# Patient Record
Sex: MALE | Race: WHITE | NOT HISPANIC OR LATINO | URBAN - METROPOLITAN AREA
[De-identification: names, ages, dates, MRNs, and addresses within clinical notes are randomized per-mention and may not be internally consistent; named-entity substitution may affect disease eponyms.]

---

## 2017-06-12 PROBLEM — Z00.00 ENCOUNTER FOR PREVENTIVE HEALTH EXAMINATION: Status: ACTIVE | Noted: 2017-06-12

## 2017-06-13 ENCOUNTER — OUTPATIENT (OUTPATIENT)
Dept: OUTPATIENT SERVICES | Facility: HOSPITAL | Age: 72
LOS: 1 days | End: 2017-06-13
Payer: MEDICARE

## 2017-06-13 ENCOUNTER — APPOINTMENT (OUTPATIENT)
Dept: MRI IMAGING | Facility: CLINIC | Age: 72
End: 2017-06-13

## 2017-06-13 DIAGNOSIS — Z00.8 ENCOUNTER FOR OTHER GENERAL EXAMINATION: ICD-10-CM

## 2017-06-13 PROCEDURE — 73721 MRI JNT OF LWR EXTRE W/O DYE: CPT | Mod: 26,RT

## 2017-06-13 PROCEDURE — 73718 MRI LOWER EXTREMITY W/O DYE: CPT

## 2017-06-13 PROCEDURE — 73718 MRI LOWER EXTREMITY W/O DYE: CPT | Mod: 26,RT

## 2017-06-13 PROCEDURE — 73721 MRI JNT OF LWR EXTRE W/O DYE: CPT

## 2017-06-21 ENCOUNTER — FORM ENCOUNTER (OUTPATIENT)
Age: 72
End: 2017-06-21

## 2017-06-22 ENCOUNTER — APPOINTMENT (OUTPATIENT)
Dept: ULTRASOUND IMAGING | Facility: CLINIC | Age: 72
End: 2017-06-22

## 2017-06-22 ENCOUNTER — OUTPATIENT (OUTPATIENT)
Dept: OUTPATIENT SERVICES | Facility: HOSPITAL | Age: 72
LOS: 1 days | End: 2017-06-22
Payer: MEDICARE

## 2017-06-22 ENCOUNTER — APPOINTMENT (OUTPATIENT)
Dept: ORTHOPEDIC SURGERY | Facility: CLINIC | Age: 72
End: 2017-06-22

## 2017-06-22 VITALS
WEIGHT: 152 LBS | HEART RATE: 62 BPM | DIASTOLIC BLOOD PRESSURE: 73 MMHG | HEIGHT: 71 IN | BODY MASS INDEX: 21.28 KG/M2 | SYSTOLIC BLOOD PRESSURE: 111 MMHG

## 2017-06-22 DIAGNOSIS — Z00.8 ENCOUNTER FOR OTHER GENERAL EXAMINATION: ICD-10-CM

## 2017-06-22 DIAGNOSIS — Z56.0 UNEMPLOYMENT, UNSPECIFIED: ICD-10-CM

## 2017-06-22 DIAGNOSIS — E78.00 PURE HYPERCHOLESTEROLEMIA, UNSPECIFIED: ICD-10-CM

## 2017-06-22 DIAGNOSIS — Z78.9 OTHER SPECIFIED HEALTH STATUS: ICD-10-CM

## 2017-06-22 DIAGNOSIS — M79.671 PAIN IN RIGHT FOOT: ICD-10-CM

## 2017-06-22 PROCEDURE — 93970 EXTREMITY STUDY: CPT

## 2017-06-22 SDOH — ECONOMIC STABILITY - INCOME SECURITY: UNEMPLOYMENT, UNSPECIFIED: Z56.0

## 2017-06-23 PROBLEM — Z78.9 EXERCISES OCCASIONALLY: Status: ACTIVE | Noted: 2017-06-22

## 2017-06-23 PROBLEM — Z56.0 UNEMPLOYED: Status: ACTIVE | Noted: 2017-06-22

## 2017-06-23 PROBLEM — Z78.9 DOES NOT USE ILLICIT DRUGS: Status: ACTIVE | Noted: 2017-06-22

## 2017-06-23 PROBLEM — E78.00 HIGH CHOLESTEROL: Status: RESOLVED | Noted: 2017-06-22 | Resolved: 2017-06-23

## 2017-06-23 PROBLEM — Z78.9 CONSUMES ALCOHOL OCCASIONALLY: Status: ACTIVE | Noted: 2017-06-22

## 2017-06-23 RX ORDER — RIVAROXABAN 20 MG/1
20 TABLET, FILM COATED ORAL
Refills: 0 | Status: ACTIVE | COMMUNITY

## 2017-06-23 RX ORDER — HYDROCHLOROTHIAZIDE 12.5 MG/1
12.5 CAPSULE ORAL
Refills: 0 | Status: ACTIVE | COMMUNITY

## 2017-06-23 RX ORDER — ATORVASTATIN CALCIUM 20 MG/1
20 TABLET, FILM COATED ORAL
Refills: 0 | Status: ACTIVE | COMMUNITY

## 2017-06-23 RX ORDER — ATENOLOL 25 MG/1
25 TABLET ORAL
Refills: 0 | Status: ACTIVE | COMMUNITY

## 2017-06-23 RX ORDER — RAMIPRIL 5 MG/1
CAPSULE ORAL
Refills: 0 | Status: ACTIVE | COMMUNITY

## 2021-03-02 ENCOUNTER — APPOINTMENT (OUTPATIENT)
Dept: SURGERY | Facility: CLINIC | Age: 76
End: 2021-03-02

## 2021-04-05 ENCOUNTER — OUTPATIENT (OUTPATIENT)
Dept: OUTPATIENT SERVICES | Facility: HOSPITAL | Age: 76
LOS: 1 days | End: 2021-04-05

## 2021-04-05 DIAGNOSIS — K40.91 UNILATERAL INGUINAL HERNIA, WITHOUT OBSTRUCTION OR GANGRENE, RECURRENT: ICD-10-CM

## 2021-04-05 DIAGNOSIS — Z98.890 OTHER SPECIFIED POSTPROCEDURAL STATES: Chronic | ICD-10-CM

## 2021-04-05 DIAGNOSIS — Z90.89 ACQUIRED ABSENCE OF OTHER ORGANS: Chronic | ICD-10-CM

## 2021-04-05 DIAGNOSIS — Z01.818 ENCOUNTER FOR OTHER PREPROCEDURAL EXAMINATION: ICD-10-CM

## 2021-04-05 NOTE — CHART NOTE - NSCHARTNOTEFT_GEN_A_CORE
Plan  1. Stop all NSAIDS, herbal supplements and vitamins for 7 days.  2. NPO as per ASU instructions  3. Take the following medications ( Ramipril, Atenolol ) with small sips of water on the morning of your procedure/surgery.  4. Use EZ sponges as directed  5. Use mupirocin as directed  6. Labs, EKG, on chart from PMD. COVID test on 04/09/2021, pt instructed.   7. PMD/cardiologist visit for optimization prior to surgery as per surgeon.  8. Advised to quarantine prior to surgery

## 2021-04-06 DIAGNOSIS — K40.91 UNILATERAL INGUINAL HERNIA, WITHOUT OBSTRUCTION OR GANGRENE, RECURRENT: ICD-10-CM

## 2021-04-06 DIAGNOSIS — Z01.818 ENCOUNTER FOR OTHER PREPROCEDURAL EXAMINATION: ICD-10-CM

## 2021-04-08 DIAGNOSIS — Z01.818 ENCOUNTER FOR OTHER PREPROCEDURAL EXAMINATION: ICD-10-CM

## 2021-04-09 ENCOUNTER — APPOINTMENT (OUTPATIENT)
Dept: DISASTER EMERGENCY | Facility: CLINIC | Age: 76
End: 2021-04-09

## 2021-04-10 LAB — SARS-COV-2 N GENE NPH QL NAA+PROBE: NOT DETECTED

## 2021-04-12 ENCOUNTER — OUTPATIENT (OUTPATIENT)
Dept: INPATIENT UNIT | Facility: HOSPITAL | Age: 76
LOS: 1 days | Discharge: HOME | End: 2021-04-12
Payer: MEDICARE

## 2021-04-12 ENCOUNTER — RESULT REVIEW (OUTPATIENT)
Age: 76
End: 2021-04-12

## 2021-04-12 VITALS
OXYGEN SATURATION: 97 % | HEART RATE: 56 BPM | SYSTOLIC BLOOD PRESSURE: 119 MMHG | TEMPERATURE: 97 F | RESPIRATION RATE: 16 BRPM | DIASTOLIC BLOOD PRESSURE: 82 MMHG

## 2021-04-12 VITALS
SYSTOLIC BLOOD PRESSURE: 127 MMHG | HEART RATE: 55 BPM | RESPIRATION RATE: 16 BRPM | HEIGHT: 70.5 IN | OXYGEN SATURATION: 98 % | WEIGHT: 158.95 LBS | DIASTOLIC BLOOD PRESSURE: 84 MMHG | TEMPERATURE: 98 F

## 2021-04-12 DIAGNOSIS — E78.5 HYPERLIPIDEMIA, UNSPECIFIED: ICD-10-CM

## 2021-04-12 DIAGNOSIS — Z79.899 OTHER LONG TERM (CURRENT) DRUG THERAPY: ICD-10-CM

## 2021-04-12 DIAGNOSIS — I25.10 ATHEROSCLEROTIC HEART DISEASE OF NATIVE CORONARY ARTERY WITHOUT ANGINA PECTORIS: ICD-10-CM

## 2021-04-12 DIAGNOSIS — I44.0 ATRIOVENTRICULAR BLOCK, FIRST DEGREE: ICD-10-CM

## 2021-04-12 DIAGNOSIS — N40.0 BENIGN PROSTATIC HYPERPLASIA WITHOUT LOWER URINARY TRACT SYMPTOMS: ICD-10-CM

## 2021-04-12 DIAGNOSIS — Z90.89 ACQUIRED ABSENCE OF OTHER ORGANS: Chronic | ICD-10-CM

## 2021-04-12 DIAGNOSIS — Z98.890 OTHER SPECIFIED POSTPROCEDURAL STATES: Chronic | ICD-10-CM

## 2021-04-12 DIAGNOSIS — K40.91 UNILATERAL INGUINAL HERNIA, WITHOUT OBSTRUCTION OR GANGRENE, RECURRENT: ICD-10-CM

## 2021-04-12 DIAGNOSIS — D17.6 BENIGN LIPOMATOUS NEOPLASM OF SPERMATIC CORD: ICD-10-CM

## 2021-04-12 DIAGNOSIS — I10 ESSENTIAL (PRIMARY) HYPERTENSION: ICD-10-CM

## 2021-04-12 DIAGNOSIS — Z86.718 PERSONAL HISTORY OF OTHER VENOUS THROMBOSIS AND EMBOLISM: ICD-10-CM

## 2021-04-12 DIAGNOSIS — L90.5 SCAR CONDITIONS AND FIBROSIS OF SKIN: ICD-10-CM

## 2021-04-12 DIAGNOSIS — I08.1 RHEUMATIC DISORDERS OF BOTH MITRAL AND TRICUSPID VALVES: ICD-10-CM

## 2021-04-12 PROCEDURE — 88304 TISSUE EXAM BY PATHOLOGIST: CPT

## 2021-04-12 PROCEDURE — C1781: CPT

## 2021-04-12 PROCEDURE — 88304 TISSUE EXAM BY PATHOLOGIST: CPT | Mod: 26

## 2021-04-12 RX ORDER — ACETAMINOPHEN 500 MG
1000 TABLET ORAL ONCE
Refills: 0 | Status: DISCONTINUED | OUTPATIENT
Start: 2021-04-12 | End: 2021-04-12

## 2021-04-12 RX ORDER — ONDANSETRON 8 MG/1
4 TABLET, FILM COATED ORAL ONCE
Refills: 0 | Status: DISCONTINUED | OUTPATIENT
Start: 2021-04-12 | End: 2021-04-12

## 2021-04-12 RX ORDER — ATENOLOL 25 MG/1
1 TABLET ORAL
Qty: 0 | Refills: 0 | DISCHARGE

## 2021-04-12 RX ORDER — ATORVASTATIN CALCIUM 80 MG/1
1 TABLET, FILM COATED ORAL
Qty: 0 | Refills: 0 | DISCHARGE

## 2021-04-12 RX ORDER — HYDROMORPHONE HYDROCHLORIDE 2 MG/ML
0.5 INJECTION INTRAMUSCULAR; INTRAVENOUS; SUBCUTANEOUS
Refills: 0 | Status: DISCONTINUED | OUTPATIENT
Start: 2021-04-12 | End: 2021-04-12

## 2021-04-12 RX ORDER — FENTANYL CITRATE 50 UG/ML
50 INJECTION INTRAVENOUS
Refills: 0 | Status: DISCONTINUED | OUTPATIENT
Start: 2021-04-12 | End: 2021-04-12

## 2021-04-12 RX ORDER — SODIUM CHLORIDE 9 MG/ML
1000 INJECTION, SOLUTION INTRAVENOUS
Refills: 0 | Status: DISCONTINUED | OUTPATIENT
Start: 2021-04-12 | End: 2021-04-12

## 2021-04-12 RX ORDER — RAMIPRIL 5 MG
1 CAPSULE ORAL
Qty: 0 | Refills: 0 | DISCHARGE

## 2021-04-12 RX ORDER — OXYCODONE HYDROCHLORIDE 5 MG/1
5 TABLET ORAL ONCE
Refills: 0 | Status: DISCONTINUED | OUTPATIENT
Start: 2021-04-12 | End: 2021-04-12

## 2021-04-12 NOTE — ASU DISCHARGE PLAN (ADULT/PEDIATRIC) - CARE PROVIDER_API CALL
Vish Grant)  Surgery; Surgical Critical Care  [address] [place] [address]  Phone: [phone]  Fax: [phone]  Follow Up Time:

## 2021-04-12 NOTE — ASU DISCHARGE PLAN (ADULT/PEDIATRIC) - ASU DC SPECIAL INSTRUCTIONSFT
Please follow up in Dr. Grant surgery office in two weeks. Please take dressing off in 2 days following surgery then ok to wash area with warm soapy water. No heavy lifting for 4-6 weeks following surgery, <10lbs. If any acute changes in medical condition report to emergency department.

## 2021-04-12 NOTE — BRIEF OPERATIVE NOTE - OPERATION/FINDINGS
Open inguinal hernia repair with mesh  Extensive scarring noted from prior hernia repair with mesh  Exparel block applied  PACU in stable condition

## 2022-05-03 ENCOUNTER — NON-APPOINTMENT (OUTPATIENT)
Age: 77
End: 2022-05-03

## 2022-07-14 ENCOUNTER — EMERGENCY (EMERGENCY)
Facility: HOSPITAL | Age: 77
LOS: 0 days | Discharge: HOME | End: 2022-07-14
Attending: STUDENT IN AN ORGANIZED HEALTH CARE EDUCATION/TRAINING PROGRAM
Payer: MEDICARE

## 2022-07-14 VITALS — HEIGHT: 70 IN | WEIGHT: 156.09 LBS

## 2022-07-14 VITALS
TEMPERATURE: 98 F | RESPIRATION RATE: 18 BRPM | HEART RATE: 60 BPM | SYSTOLIC BLOOD PRESSURE: 126 MMHG | OXYGEN SATURATION: 99 % | DIASTOLIC BLOOD PRESSURE: 62 MMHG

## 2022-07-14 DIAGNOSIS — I10 ESSENTIAL (PRIMARY) HYPERTENSION: ICD-10-CM
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
## 2022-07-14 DIAGNOSIS — Z23 ENCOUNTER FOR IMMUNIZATION: ICD-10-CM

## 2022-07-14 DIAGNOSIS — Z90.89 ACQUIRED ABSENCE OF OTHER ORGANS: Chronic | ICD-10-CM

## 2022-07-14 DIAGNOSIS — Z98.890 OTHER SPECIFIED POSTPROCEDURAL STATES: Chronic | ICD-10-CM
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
## 2022-07-14 DIAGNOSIS — R51.9 HEADACHE, UNSPECIFIED: ICD-10-CM

## 2022-07-14 DIAGNOSIS — E78.00 PURE HYPERCHOLESTEROLEMIA, UNSPECIFIED: ICD-10-CM

## 2022-07-14 DIAGNOSIS — S01.01XA LACERATION WITHOUT FOREIGN BODY OF SCALP, INITIAL ENCOUNTER: ICD-10-CM

## 2022-07-14 DIAGNOSIS — S09.90XA UNSPECIFIED INJURY OF HEAD, INITIAL ENCOUNTER: ICD-10-CM

## 2022-07-14 DIAGNOSIS — K40.90 UNILATERAL INGUINAL HERNIA, WITHOUT OBSTRUCTION OR GANGRENE, NOT SPECIFIED AS RECURRENT: ICD-10-CM

## 2022-07-14 PROCEDURE — 90715 TDAP VACCINE 7 YRS/> IM: CPT

## 2022-07-14 PROCEDURE — 70450 CT HEAD/BRAIN W/O DYE: CPT | Mod: MA

## 2022-07-14 PROCEDURE — 70450 CT HEAD/BRAIN W/O DYE: CPT | Mod: 26,MA

## 2022-07-14 PROCEDURE — 99284 EMERGENCY DEPT VISIT MOD MDM: CPT | Mod: 25

## 2022-07-14 PROCEDURE — 12002 RPR S/N/AX/GEN/TRNK2.6-7.5CM: CPT

## 2022-07-14 RX ORDER — TETANUS TOXOID, REDUCED DIPHTHERIA TOXOID AND ACELLULAR PERTUSSIS VACCINE, ADSORBED 5; 2.5; 8; 8; 2.5 [IU]/.5ML; [IU]/.5ML; UG/.5ML; UG/.5ML; UG/.5ML
0.5 SUSPENSION INTRAMUSCULAR ONCE
Refills: 0 | Status: COMPLETED | OUTPATIENT
Start: 2022-07-14 | End: 2022-07-14

## 2022-07-14 RX ADMIN — TETANUS TOXOID, REDUCED DIPHTHERIA TOXOID AND ACELLULAR PERTUSSIS VACCINE, ADSORBED 0.5 MILLILITER(S): 5; 2.5; 8; 8; 2.5 SUSPENSION INTRAMUSCULAR at 12:12

## 2022-07-14 NOTE — ED PROVIDER NOTE - NSFOLLOWUPINSTRUCTIONS_ED_ALL_ED_FT
Laceration    WHAT YOU NEED TO KNOW:    A laceration is an injury to the skin and the soft tissue underneath it. Lacerations happen when you are cut or hit by something. They can happen anywhere on the body.     DISCHARGE INSTRUCTIONS:    Return to the emergency department if:     You have heavy bleeding or bleeding that does not stop after 10 minutes of holding firm, direct pressure over the wound.       Your wound opens up.     Contact your healthcare provider if:     You have a fever or chills.       Your laceration is red, warm, or swollen.      You have red streaks on your skin coming from your wound.      You have white or yellow drainage from the wound that smells bad.      You have pain that gets worse, even after treatment.       You have questions or concerns about your condition or care.     Medicines:     Prescription pain medicine may be given. Ask how to take this medicine safely.       Antibiotics help treat or prevent a bacterial infection.       Take your medicine as directed. Contact your healthcare provider if you think your medicine is not helping or if you have side effects. Tell him or her if you are allergic to any medicine. Keep a list of the medicines, vitamins, and herbs you take. Include the amounts, and when and why you take them. Bring the list or the pill bottles to follow-up visits. Carry your medicine list with you in case of an emergency.    Care for your wound as directed:     Do not get your wound wet until your healthcare provider says it is okay. Do not soak your wound in water. Do not go swimming until your healthcare provider says it is okay. Carefully wash the wound with soap and water. Gently pat the area dry or allow it to air dry.       Change your bandages when they get wet, dirty, or after washing. Apply new, clean bandages as directed. Do not apply elastic bandages or tape too tight. Do not put powders or lotions over your incision.       Apply antibiotic ointment as directed. Your healthcare provider may give you antibiotic ointment to put over your wound if you have stitches. If you have strips of tape over your incision, let them dry up and fall off on their own. If they do not fall off within 14 days, gently remove them. If you have glue over your wound, do not remove or pick at it. If your glue comes off, do not replace it with glue that you have at home.       Check your wound every day for signs of infection such as swelling, redness, or pus.     Self-care:     Apply ice on your wound for 15 to 20 minutes every hour or as directed. Use an ice pack, or put crushed ice in a plastic bag. Cover it with a towel. Ice helps prevent tissue damage and decreases swelling and pain.      Use a splint as directed. A splint will decrease movement and stress on your wound. It may help it heal faster. A splint may be used for lacerations over joints or areas of your body that bend. Ask your healthcare provider how to apply and remove a splint.       Decrease scarring of your wound by applying ointments as directed. Do not apply ointments until your healthcare provider says it is okay. You may need to wait until your wound is healed. Ask which ointment to buy and how often to use it. After your wound is healed, use sunscreen over the area when you are out in the sun. You should do this for at least 6 months to 1 year after your injury.     Follow up with your healthcare provider as directed: You may need to follow up in 24 to 48 hours to have your wound checked for infection. You will need to return in 3 to 14 days if you have stitches or staples so they can be removed. Care for your wound as directed to prevent infection and help it heal. Write down your questions so you remember to ask them during your visits.    Staple removal in 10 days

## 2022-07-14 NOTE — ED ADULT TRIAGE NOTE - CHIEF COMPLAINT QUOTE
pt. c/o headache s/p head strike on construction site, approx 6cm horseshoe shaped avulsion on crown of head. Pt. states he was walked and did not ducked down far enough and hit top of head, no fall no blood thinners, no LOC. neuro alert called.

## 2022-07-14 NOTE — ED PROVIDER NOTE - NSICDXPASTSURGICALHX_GEN_ALL_CORE_FT
PAST SURGICAL HISTORY:  H/O left inguinal hernia repair     H/O right inguinal hernia repair     History of tonsillectomy

## 2022-07-14 NOTE — ED PROVIDER NOTE - OBJECTIVE STATEMENT
75 y/o male was working on his house and he didn't realize his positioning causing him to hit his head on wood, endorsing HA, no LOC, no blood thinners no neck pain. 77 y/o male was working on his house with closed head injury; hit on piece of wood; no loc; no anticoagulation; no fever or chills; no chest pain; no sob; no abdominal pain; no weakness; no numbness.

## 2022-07-14 NOTE — ED PROVIDER NOTE - PATIENT PORTAL LINK FT
You can access the FollowMyHealth Patient Portal offered by [facility] by registering at the following website: [email or web address]. By joining [facility]’s FollowMyHealth portal, you will also be able to view your health information using other applications (apps) compatible with our system.

## 2022-07-14 NOTE — ED PROVIDER NOTE - PROGRESS NOTE DETAILS
Kevin DO: laceration repaired by SHREYAS Rodriguez- Edwin staples; ambulatory in ED with steady gait; instructed to f/u with pmd in 1-2 days without fail; staple removal in 10 days; strict return precautions given.

## 2022-07-14 NOTE — ED PROVIDER NOTE - SKIN, MLM
Skin normal color for race, warm, dry. No evidence of rash. 6 cm semicircular laceration to top of head. Skin normal color for race, warm;  6 cm semicircular laceration to top of head.

## 2022-07-14 NOTE — ED PROCEDURE NOTE - NS ED ATTENDING STATEMENT MOD
This was a shared visit with the BUSHRA. I reviewed and verified the documentation and independently performed the documented:

## 2022-07-24 ENCOUNTER — EMERGENCY (EMERGENCY)
Facility: HOSPITAL | Age: 77
LOS: 0 days | Discharge: HOME | End: 2022-07-24
Attending: STUDENT IN AN ORGANIZED HEALTH CARE EDUCATION/TRAINING PROGRAM
Payer: MEDICARE

## 2022-07-24 VITALS
DIASTOLIC BLOOD PRESSURE: 74 MMHG | RESPIRATION RATE: 18 BRPM | SYSTOLIC BLOOD PRESSURE: 110 MMHG | TEMPERATURE: 98 F | HEART RATE: 68 BPM | OXYGEN SATURATION: 100 %

## 2022-07-24 VITALS — HEIGHT: 70 IN | WEIGHT: 156.09 LBS

## 2022-07-24 DIAGNOSIS — Z90.89 ACQUIRED ABSENCE OF OTHER ORGANS: ICD-10-CM

## 2022-07-24 DIAGNOSIS — E78.00 PURE HYPERCHOLESTEROLEMIA, UNSPECIFIED: ICD-10-CM
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
## 2022-07-24 DIAGNOSIS — I10 ESSENTIAL (PRIMARY) HYPERTENSION: ICD-10-CM

## 2022-07-24 DIAGNOSIS — Z98.890 OTHER SPECIFIED POSTPROCEDURAL STATES: Chronic | ICD-10-CM

## 2022-07-24 DIAGNOSIS — Z90.89 ACQUIRED ABSENCE OF OTHER ORGANS: Chronic | ICD-10-CM

## 2022-07-24 DIAGNOSIS — Z98.890 OTHER SPECIFIED POSTPROCEDURAL STATES: ICD-10-CM

## 2022-07-24 DIAGNOSIS — Z48.02 ENCOUNTER FOR REMOVAL OF SUTURES: ICD-10-CM

## 2022-07-24 DIAGNOSIS — S01.01XD LACERATION WITHOUT FOREIGN BODY OF SCALP, SUBSEQUENT ENCOUNTER: ICD-10-CM

## 2022-07-24 PROCEDURE — L9995: CPT

## 2022-07-24 PROCEDURE — 99212 OFFICE O/P EST SF 10 MIN: CPT

## 2022-07-24 NOTE — ED ADULT TRIAGE NOTE - CHIEF COMPLAINT QUOTE
Pt presented to the ER with request of staple removal. Pt noted to have staples to the top of his head.

## 2022-07-24 NOTE — ED STATDOCS - ATTENDING APP SHARED VISIT CONTRIBUTION OF CARE
I, Lilo Gregory DO, personally saw the patient with ACP.  I performed a substantiative portion of the visit. I personally performed a face to face diagnostic evaluation on this patient and formulated the patient plan. Free text HPI, ROS, PE, MDM documented above by myself or with the aid of a scribe and represents my findings. The case was discussed with, and handed off to ACP who followed the case through to the re-evaluation and disposition.

## 2022-07-24 NOTE — ED STATDOCS - PATIENT PORTAL LINK FT
You can access the FollowMyHealth Patient Portal offered by [facility] by registering at the following website: [email or web address]. By joining [facility]’s FollowMyHealth portal, you will also be able to view your health information using other applications (apps) compatible with our system.

## 2022-07-24 NOTE — ED STATDOCS - OBJECTIVE STATEMENT
77 y/o male with a PMHx of HTN, inguinal hernia, hypercholesteremia s/p repair  presents to the ED requesting  suture removal. Notes he received staples to scalp after injury. Denies fever or discharge from staple site.

## 2022-07-24 NOTE — ED STATDOCS - NS ED ROS FT
Constitutional: No reported recent fever.  Neurological: No reported acute headache.  Eyes: No reported new vision changes.   Ears, Nose, Mouth, Throat: No reported acute sore throat.  Cardiovascular: No reported current chest pain.  Respiratory: No reported new shortness of breath.  Gastrointestinal: No reported vomiting.  Genitourinary: No reported new urinary problems.  Musculoskeletal: No reported acute extremity pain.  Integumentary (skin and/or breast): No reported new rash. No discharge from staple site

## 2022-09-08 NOTE — ASU PREOP CHECKLIST - WEIGHT IN LBS
"Subjective:       Patient ID: Lam Rhoades is a 31 y.o. male.    Vitals:  height is 5' 9" (1.753 m) and weight is 90.7 kg (200 lb). His tympanic temperature is 100.6 °F (38.1 °C) (abnormal). His blood pressure is 132/68 and his pulse is 109. His respiration is 18 and oxygen saturation is 99%.     Chief Complaint: Cough    Pt is coming in with cough and congestion that started today. Pt was exposed to Covid. Pt also has a wet sounding cough. Pt didn't take any medication to help.     Cough  This is a new problem. The current episode started today. The problem has been unchanged. The problem occurs every few minutes. The cough is Productive of sputum. Nothing aggravates the symptoms. He has tried nothing for the symptoms. The treatment provided no relief.     Respiratory:  Positive for cough.    Skin:  Negative for erythema.     Objective:      Physical Exam   Constitutional: He is oriented to person, place, and time. He appears well-developed. He is cooperative.  Non-toxic appearance. He does not appear ill. No distress.   HENT:   Head: Normocephalic and atraumatic.   Ears:   Right Ear: Hearing, tympanic membrane, external ear and ear canal normal.   Left Ear: Hearing, tympanic membrane, external ear and ear canal normal.   Nose: Nose normal. No mucosal edema, rhinorrhea or nasal deformity. No epistaxis. Right sinus exhibits no maxillary sinus tenderness and no frontal sinus tenderness. Left sinus exhibits no maxillary sinus tenderness and no frontal sinus tenderness.   Mouth/Throat: Uvula is midline, oropharynx is clear and moist and mucous membranes are normal. No trismus in the jaw. Normal dentition. No uvula swelling. No oropharyngeal exudate, posterior oropharyngeal edema or posterior oropharyngeal erythema.   Eyes: Conjunctivae, EOM and lids are normal. Pupils are equal, round, and reactive to light. Right eye exhibits no discharge. Left eye exhibits no discharge. No scleral icterus.   Neck: Trachea normal " and phonation normal. Neck supple. No JVD present. No tracheal deviation present. No thyromegaly present. No edema present. No erythema present. No neck rigidity present.   Cardiovascular: Normal rate, regular rhythm, normal heart sounds and normal pulses.   No murmur heard.Exam reveals no gallop and no friction rub.   Pulmonary/Chest: Effort normal and breath sounds normal. No stridor. No respiratory distress. He has no decreased breath sounds. He has no wheezes. He has no rhonchi. He has no rales. He exhibits no tenderness.   Abdominal: Normal appearance. He exhibits no distension. Soft. There is no abdominal tenderness. There is no rebound and no guarding.   Musculoskeletal: Normal range of motion.         General: No deformity. Normal range of motion.   Neurological: no focal deficit. He is alert and oriented to person, place, and time. He exhibits normal muscle tone. Coordination normal.   Skin: Skin is warm, dry, intact, not diaphoretic, not pale and no rash. Capillary refill takes less than 2 seconds. No erythema   Psychiatric: His speech is normal and behavior is normal. Judgment and thought content normal.   Nursing note and vitals reviewed.      Assessment:       1. Cough    2. COVID-19          Plan:         Cough  -     POCT COVID-19 Rapid Screening    COVID-19  -     nirmatrelvir-ritonavir 300 mg (150 mg x 2)-100 mg copackaged tablets (EUA); Take 3 tablets by mouth 2 (two) times daily for 5 days. Each dose contains 2 nirmatrelvir (pink tablets) and 1 ritonavir (white tablet). Take all 3 tablets together  Dispense: 30 tablet; Refill: 0                    158.9

## 2022-10-28 ENCOUNTER — APPOINTMENT (OUTPATIENT)
Dept: RADIOLOGY | Facility: CLINIC | Age: 77
End: 2022-10-28

## 2022-10-28 ENCOUNTER — OUTPATIENT (OUTPATIENT)
Dept: OUTPATIENT SERVICES | Facility: HOSPITAL | Age: 77
LOS: 1 days | End: 2022-10-28
Payer: MEDICARE

## 2022-10-28 DIAGNOSIS — Z98.890 OTHER SPECIFIED POSTPROCEDURAL STATES: Chronic | ICD-10-CM

## 2022-10-28 DIAGNOSIS — M25.572 PAIN IN LEFT ANKLE AND JOINTS OF LEFT FOOT: ICD-10-CM

## 2022-10-28 DIAGNOSIS — Z90.89 ACQUIRED ABSENCE OF OTHER ORGANS: Chronic | ICD-10-CM

## 2022-10-28 PROCEDURE — 73610 X-RAY EXAM OF ANKLE: CPT | Mod: 26,LT

## 2022-10-28 PROCEDURE — 73610 X-RAY EXAM OF ANKLE: CPT

## 2022-10-28 PROCEDURE — 73630 X-RAY EXAM OF FOOT: CPT | Mod: 26,LT

## 2022-10-28 PROCEDURE — 73630 X-RAY EXAM OF FOOT: CPT

## 2022-11-01 ENCOUNTER — NON-APPOINTMENT (OUTPATIENT)
Age: 77
End: 2022-11-01

## 2022-11-08 ENCOUNTER — APPOINTMENT (OUTPATIENT)
Dept: ULTRASOUND IMAGING | Facility: CLINIC | Age: 77
End: 2022-11-08

## 2022-11-09 ENCOUNTER — NON-APPOINTMENT (OUTPATIENT)
Age: 77
End: 2022-11-09

## 2022-11-09 ENCOUNTER — APPOINTMENT (OUTPATIENT)
Dept: ORTHOPEDIC SURGERY | Facility: CLINIC | Age: 77
End: 2022-11-09

## 2022-11-09 ENCOUNTER — APPOINTMENT (OUTPATIENT)
Dept: ORTHOPEDIC SURGERY | Facility: HOSPITAL | Age: 77
End: 2022-11-09

## 2022-11-09 DIAGNOSIS — M25.572 PAIN IN LEFT ANKLE AND JOINTS OF LEFT FOOT: ICD-10-CM

## 2022-11-09 DIAGNOSIS — M76.72 PERONEAL TENDINITIS, LEFT LEG: ICD-10-CM

## 2022-11-09 PROCEDURE — 99204 OFFICE O/P NEW MOD 45 MIN: CPT

## 2022-11-09 PROCEDURE — 73610 X-RAY EXAM OF ANKLE: CPT | Mod: LT

## 2022-11-09 NOTE — ADDENDUM
[FreeTextEntry1] : I, Sera Rlodan, acted solely as a scribe for Dr. Magdy Mota on this date 11/09/2022.\par \par All medical record entries made by the Scribe were at my, Dr. Magdy Mota, direction and personally dictated by me on 11/09/2022. I have reviewed the chart and agree that the record accurately reflects my personal performance of the history, physical exam, assessment and plan. I have also personally directed, reviewed, and agreed with the chart.	\par

## 2022-11-09 NOTE — PHYSICAL EXAM
[de-identified] : Left ankle Physical Examination:\par \par General: Alert and oriented x3.  In no acute distress.  Pleasant in nature with a normal affect.  No apparent respiratory distress. \par Erythema, Warmth, Rubor: Negative\par Swelling: Negative\par \par ROM:\par 1. Dorsiflexion: 10 degrees\par 2. Plantarflexion: 40 degrees\par 3. Inversion: 30 degrees\par 4. Eversion: 30 degrees\par \par Tenderness to Palpation: \par 1. Lateral Malleolus: Negative\par 2. Medial Malleolus: Negative\par 3. Proximal Fibular Pain: Negative\par 4. Heel Pain: Negative\par 5. Cuboid: Negative\par 6. Navicular: Negative\par 7. Tibiotalar Joint: Negative\par 8. Subtalar Joint: Negative\par 9. Posterior Recess: Negative\par \par Tendon Pain:\par 1. Achilles: Negative\par 2. Peroneals: Positive\par 3. Posterior Tibialis: Negative\par 4. Tibialis Anterior: Negative\par \par Ligament Pain:\par 1. ATFL: Negative\par 2. CFL: Negative \par 3. PTFL: Negative\par 4. Deltoid Ligaments: Negative\par 5. Lis Franc Ligament: Negative\par \par Stability: \par 1. Anterior Drawer: Negative\par 2. Posterior Drawer: Negative\par \par Strength: 5/5 TA/GS/EHL\par \par Pulses: 2+ DP/PT Pulses\par \par Neuro: Intact motor and sensory\par \par Additional Test:\par 1. Calcaneal Squeeze Test: Negative\par 2. Syndesmosis Squeeze Test: Negative\par \par ***Posterior ankle bursitis with pain.  [de-identified] : X-rays of the left ankle reviewed, 3 views, 11/9/2022: Posterior os trigonum noted.  No fracture seen.

## 2022-11-09 NOTE — HISTORY OF PRESENT ILLNESS
[FreeTextEntry1] : The patient is a 77-year-old male who presents with lateral ankle pain, acute. He was seen for this by Dr. Doyle, who obtained negative testing as per the patient.  He had no trauma to his ankle.  He did recently take a Medrol Dosepak and states that the Medrol Dosepak has helped him significantly and he has mild pain today in the left ankle, lateral side.  Patient wearing sneakers, walking without assistance.  No other complaints.

## 2022-11-09 NOTE — DISCUSSION/SUMMARY
[de-identified] : Assessment: Left ankle tendinitis\par \par Plan:\par 1. Physical Therapy/home exercise program.\par 2. NSAIDs/Tylenol as needed for pain. \par 3. Return to normal activities as tolerated.  Over-the-counter ankle sleeve for support.\par 4. Continue with ice and heat therapy. \par 5. All questions answered.  Follow-up in 6-8 weeks for reevaluation. If pain persists consider advanced imaging in the future.  The patient understood the treatment plan.

## 2023-01-19 ENCOUNTER — NON-APPOINTMENT (OUTPATIENT)
Age: 78
End: 2023-01-19

## 2024-11-07 ENCOUNTER — APPOINTMENT (OUTPATIENT)
Dept: UROLOGY | Facility: CLINIC | Age: 79
End: 2024-11-07
Payer: MEDICARE

## 2024-11-07 ENCOUNTER — RESULT REVIEW (OUTPATIENT)
Age: 79
End: 2024-11-07

## 2024-11-07 VITALS
DIASTOLIC BLOOD PRESSURE: 77 MMHG | SYSTOLIC BLOOD PRESSURE: 120 MMHG | WEIGHT: 150 LBS | HEIGHT: 71 IN | RESPIRATION RATE: 16 BRPM | BODY MASS INDEX: 21 KG/M2 | HEART RATE: 61 BPM | OXYGEN SATURATION: 98 %

## 2024-11-07 DIAGNOSIS — N40.0 BENIGN PROSTATIC HYPERPLASIA WITHOUT LOWER URINARY TRACT SYMPMS: ICD-10-CM

## 2024-11-07 DIAGNOSIS — Z12.5 ENCOUNTER FOR SCREENING FOR MALIGNANT NEOPLASM OF PROSTATE: ICD-10-CM

## 2024-11-07 DIAGNOSIS — R31.29 OTHER MICROSCOPIC HEMATURIA: ICD-10-CM

## 2024-11-07 PROCEDURE — 99204 OFFICE O/P NEW MOD 45 MIN: CPT

## 2024-11-08 LAB
APPEARANCE: CLEAR
BACTERIA: NEGATIVE /HPF
BILIRUBIN URINE: NEGATIVE
BLOOD URINE: ABNORMAL
CAST: 1 /LPF
COLOR: YELLOW
EPITHELIAL CELLS: 1 /HPF
GLUCOSE QUALITATIVE U: NEGATIVE MG/DL
KETONES URINE: NEGATIVE MG/DL
LEUKOCYTE ESTERASE URINE: ABNORMAL
MICROSCOPIC-UA: NORMAL
NITRITE URINE: NEGATIVE
PH URINE: 6
PROTEIN URINE: NORMAL MG/DL
RED BLOOD CELLS URINE: 138 /HPF
SPECIFIC GRAVITY URINE: 1.02
UROBILINOGEN URINE: 0.2 MG/DL
WHITE BLOOD CELLS URINE: 2 /HPF

## 2024-11-11 LAB — BACTERIA UR CULT: NORMAL

## 2024-11-12 ENCOUNTER — APPOINTMENT (OUTPATIENT)
Dept: CT IMAGING | Facility: CLINIC | Age: 79
End: 2024-11-12
Payer: MEDICARE

## 2024-11-12 ENCOUNTER — OUTPATIENT (OUTPATIENT)
Dept: OUTPATIENT SERVICES | Facility: HOSPITAL | Age: 79
LOS: 1 days | End: 2024-11-12
Payer: MEDICARE

## 2024-11-12 DIAGNOSIS — Z90.89 ACQUIRED ABSENCE OF OTHER ORGANS: Chronic | ICD-10-CM

## 2024-11-12 DIAGNOSIS — Z98.890 OTHER SPECIFIED POSTPROCEDURAL STATES: Chronic | ICD-10-CM

## 2024-11-12 DIAGNOSIS — R31.29 OTHER MICROSCOPIC HEMATURIA: ICD-10-CM

## 2024-11-12 PROCEDURE — 74178 CT ABD&PLV WO CNTR FLWD CNTR: CPT | Mod: 26,MH

## 2024-11-12 PROCEDURE — 74178 CT ABD&PLV WO CNTR FLWD CNTR: CPT

## 2024-11-13 ENCOUNTER — APPOINTMENT (OUTPATIENT)
Dept: ORTHOPEDIC SURGERY | Facility: CLINIC | Age: 79
End: 2024-11-13
Payer: MEDICARE

## 2024-11-13 DIAGNOSIS — Z00.00 ENCOUNTER FOR GENERAL ADULT MEDICAL EXAMINATION W/OUT ABNORMAL FINDINGS: ICD-10-CM

## 2024-11-13 DIAGNOSIS — M25.571 PAIN IN RIGHT ANKLE AND JOINTS OF RIGHT FOOT: ICD-10-CM

## 2024-11-13 DIAGNOSIS — M25.371 OTHER INSTABILITY, RIGHT ANKLE: ICD-10-CM

## 2024-11-13 PROCEDURE — 73630 X-RAY EXAM OF FOOT: CPT | Mod: RT

## 2024-11-13 PROCEDURE — 99214 OFFICE O/P EST MOD 30 MIN: CPT

## 2024-11-13 PROCEDURE — 73610 X-RAY EXAM OF ANKLE: CPT | Mod: RT

## 2024-11-14 ENCOUNTER — NON-APPOINTMENT (OUTPATIENT)
Age: 79
End: 2024-11-14

## 2024-11-14 NOTE — ED STATDOCS - PHYSICAL EXAMINATION
Vital signs as available reviewed.  General:  No acute distress.  Head:  Normocephalic, atraumatic.  Eyes:  Conjunctiva pink, no icterus.  Musculoskeletal:  No obvious deformity.  Neurologic: Alert and oriented, moving all extremities.  Skin:  Warm and dry.  +approx. 7cm well healed lac to top of head, no erythema or discharge
English

## 2024-11-21 ENCOUNTER — APPOINTMENT (OUTPATIENT)
Dept: UROLOGY | Facility: CLINIC | Age: 79
End: 2024-11-21
Payer: MEDICARE

## 2024-11-21 DIAGNOSIS — N20.0 CALCULUS OF KIDNEY: ICD-10-CM

## 2024-11-21 PROCEDURE — 99213 OFFICE O/P EST LOW 20 MIN: CPT

## 2024-12-03 ENCOUNTER — OUTPATIENT (OUTPATIENT)
Dept: OUTPATIENT SERVICES | Facility: HOSPITAL | Age: 79
LOS: 1 days | End: 2024-12-03
Payer: MEDICARE

## 2024-12-03 VITALS
DIASTOLIC BLOOD PRESSURE: 77 MMHG | HEART RATE: 67 BPM | TEMPERATURE: 98 F | HEIGHT: 71 IN | RESPIRATION RATE: 16 BRPM | SYSTOLIC BLOOD PRESSURE: 114 MMHG | OXYGEN SATURATION: 100 % | WEIGHT: 152.12 LBS

## 2024-12-03 DIAGNOSIS — Z01.818 ENCOUNTER FOR OTHER PREPROCEDURAL EXAMINATION: ICD-10-CM

## 2024-12-03 DIAGNOSIS — Z98.890 OTHER SPECIFIED POSTPROCEDURAL STATES: Chronic | ICD-10-CM

## 2024-12-03 DIAGNOSIS — Z90.89 ACQUIRED ABSENCE OF OTHER ORGANS: Chronic | ICD-10-CM

## 2024-12-03 LAB
ABO RH CONFIRMATION: SIGNIFICANT CHANGE UP
ANION GAP SERPL CALC-SCNC: 4 MMOL/L — LOW (ref 5–17)
APPEARANCE UR: CLEAR — SIGNIFICANT CHANGE UP
APTT BLD: 29.4 SEC — SIGNIFICANT CHANGE UP (ref 24.5–35.6)
BACTERIA # UR AUTO: NEGATIVE /HPF — SIGNIFICANT CHANGE UP
BASOPHILS # BLD AUTO: 0.05 K/UL — SIGNIFICANT CHANGE UP (ref 0–0.2)
BASOPHILS NFR BLD AUTO: 0.9 % — SIGNIFICANT CHANGE UP (ref 0–2)
BILIRUB UR-MCNC: NEGATIVE — SIGNIFICANT CHANGE UP
BLD GP AB SCN SERPL QL: SIGNIFICANT CHANGE UP
BUN SERPL-MCNC: 33 MG/DL — HIGH (ref 7–23)
CALCIUM SERPL-MCNC: 9.1 MG/DL — SIGNIFICANT CHANGE UP (ref 8.5–10.1)
CAST: 2 /LPF — SIGNIFICANT CHANGE UP (ref 0–4)
CHLORIDE SERPL-SCNC: 108 MMOL/L — SIGNIFICANT CHANGE UP (ref 96–108)
CO2 SERPL-SCNC: 26 MMOL/L — SIGNIFICANT CHANGE UP (ref 22–31)
COLOR SPEC: YELLOW — SIGNIFICANT CHANGE UP
CREAT SERPL-MCNC: 1.75 MG/DL — HIGH (ref 0.5–1.3)
DIFF PNL FLD: ABNORMAL
EGFR: 39 ML/MIN/1.73M2 — LOW
EOSINOPHIL # BLD AUTO: 0.11 K/UL — SIGNIFICANT CHANGE UP (ref 0–0.5)
EOSINOPHIL NFR BLD AUTO: 1.9 % — SIGNIFICANT CHANGE UP (ref 0–6)
GLUCOSE SERPL-MCNC: 84 MG/DL — SIGNIFICANT CHANGE UP (ref 70–99)
GLUCOSE UR QL: NEGATIVE MG/DL — SIGNIFICANT CHANGE UP
HCT VFR BLD CALC: 42 % — SIGNIFICANT CHANGE UP (ref 39–50)
HCV AB S/CO SERPL IA: 0.06 S/CO — SIGNIFICANT CHANGE UP (ref 0–0.99)
HCV AB SERPL-IMP: SIGNIFICANT CHANGE UP
HGB BLD-MCNC: 13.7 G/DL — SIGNIFICANT CHANGE UP (ref 13–17)
IMM GRANULOCYTES NFR BLD AUTO: 0.4 % — SIGNIFICANT CHANGE UP (ref 0–0.9)
INR BLD: 0.96 RATIO — SIGNIFICANT CHANGE UP (ref 0.85–1.16)
KETONES UR-MCNC: NEGATIVE MG/DL — SIGNIFICANT CHANGE UP
LEUKOCYTE ESTERASE UR-ACNC: ABNORMAL
LYMPHOCYTES # BLD AUTO: 1.01 K/UL — SIGNIFICANT CHANGE UP (ref 1–3.3)
LYMPHOCYTES # BLD AUTO: 17.8 % — SIGNIFICANT CHANGE UP (ref 13–44)
MCHC RBC-ENTMCNC: 30.6 PG — SIGNIFICANT CHANGE UP (ref 27–34)
MCHC RBC-ENTMCNC: 32.6 G/DL — SIGNIFICANT CHANGE UP (ref 32–36)
MCV RBC AUTO: 93.8 FL — SIGNIFICANT CHANGE UP (ref 80–100)
MONOCYTES # BLD AUTO: 0.54 K/UL — SIGNIFICANT CHANGE UP (ref 0–0.9)
MONOCYTES NFR BLD AUTO: 9.5 % — SIGNIFICANT CHANGE UP (ref 2–14)
NEUTROPHILS # BLD AUTO: 3.93 K/UL — SIGNIFICANT CHANGE UP (ref 1.8–7.4)
NEUTROPHILS NFR BLD AUTO: 69.5 % — SIGNIFICANT CHANGE UP (ref 43–77)
NITRITE UR-MCNC: NEGATIVE — SIGNIFICANT CHANGE UP
PH UR: 5 — SIGNIFICANT CHANGE UP (ref 5–8)
PLATELET # BLD AUTO: 265 K/UL — SIGNIFICANT CHANGE UP (ref 150–400)
POTASSIUM SERPL-MCNC: 4.1 MMOL/L — SIGNIFICANT CHANGE UP (ref 3.5–5.3)
POTASSIUM SERPL-SCNC: 4.1 MMOL/L — SIGNIFICANT CHANGE UP (ref 3.5–5.3)
PROT UR-MCNC: SIGNIFICANT CHANGE UP MG/DL
PROTHROM AB SERPL-ACNC: 11 SEC — SIGNIFICANT CHANGE UP (ref 9.9–13.4)
RBC # BLD: 4.48 M/UL — SIGNIFICANT CHANGE UP (ref 4.2–5.8)
RBC # FLD: 12.2 % — SIGNIFICANT CHANGE UP (ref 10.3–14.5)
RBC CASTS # UR COMP ASSIST: 3 /HPF — SIGNIFICANT CHANGE UP (ref 0–4)
SODIUM SERPL-SCNC: 138 MMOL/L — SIGNIFICANT CHANGE UP (ref 135–145)
SP GR SPEC: 1.02 — SIGNIFICANT CHANGE UP (ref 1–1.03)
SQUAMOUS # UR AUTO: 1 /HPF — SIGNIFICANT CHANGE UP (ref 0–5)
UROBILINOGEN FLD QL: 0.2 MG/DL — SIGNIFICANT CHANGE UP (ref 0.2–1)
WBC # BLD: 5.66 K/UL — SIGNIFICANT CHANGE UP (ref 3.8–10.5)
WBC # FLD AUTO: 5.66 K/UL — SIGNIFICANT CHANGE UP (ref 3.8–10.5)
WBC UR QL: 11 /HPF — HIGH (ref 0–5)

## 2024-12-03 PROCEDURE — 36415 COLL VENOUS BLD VENIPUNCTURE: CPT

## 2024-12-03 PROCEDURE — 87086 URINE CULTURE/COLONY COUNT: CPT

## 2024-12-03 PROCEDURE — 86803 HEPATITIS C AB TEST: CPT

## 2024-12-03 PROCEDURE — 93010 ELECTROCARDIOGRAM REPORT: CPT

## 2024-12-03 PROCEDURE — 81001 URINALYSIS AUTO W/SCOPE: CPT

## 2024-12-03 PROCEDURE — 85610 PROTHROMBIN TIME: CPT

## 2024-12-03 PROCEDURE — 86901 BLOOD TYPING SEROLOGIC RH(D): CPT

## 2024-12-03 PROCEDURE — 86900 BLOOD TYPING SEROLOGIC ABO: CPT

## 2024-12-03 PROCEDURE — 85025 COMPLETE CBC W/AUTO DIFF WBC: CPT

## 2024-12-03 PROCEDURE — 80048 BASIC METABOLIC PNL TOTAL CA: CPT

## 2024-12-03 PROCEDURE — 93005 ELECTROCARDIOGRAM TRACING: CPT

## 2024-12-03 PROCEDURE — 99214 OFFICE O/P EST MOD 30 MIN: CPT | Mod: 25

## 2024-12-03 PROCEDURE — 85730 THROMBOPLASTIN TIME PARTIAL: CPT

## 2024-12-03 PROCEDURE — 86850 RBC ANTIBODY SCREEN: CPT

## 2024-12-03 NOTE — H&P PST ADULT - ASSESSMENT
This is a 78 y/o male with kidney stones who is scheduled for a B/L ureteroscopy, laser lithotripsy, ureteral stent placement.    Patient instructed on     1. To follow instructions as per ASU for NPO status   2. Aware that he needs medical clearance (was done by Dr. Doyle)  3. May take Atenolol with a sip of water on morning of procedure        This is a 78 y/o male with kidney stones who is scheduled for a B/L ureteroscopy, laser lithotripsy, ureteral stent placement.    Patient instructed on     1. To follow instructions as per ASU for NPO status   2. Aware that he needs medical clearance (was done by Dr. Doyle)  3. May take Atenolol with a sip of water on morning of procedure   4. Instructed to hold Ramipril on morning of procedure

## 2024-12-03 NOTE — H&P PST ADULT - NSICDXPASTMEDICALHX_GEN_ALL_CORE_FT
PAST MEDICAL HISTORY:  HTN (hypertension)     Hypercholesterolemia     Inguinal hernia      PAST MEDICAL HISTORY:  Hematuria     HTN (hypertension)     Hypercholesterolemia     Inguinal hernia     Kidney stones

## 2024-12-04 DIAGNOSIS — Z01.818 ENCOUNTER FOR OTHER PREPROCEDURAL EXAMINATION: ICD-10-CM

## 2024-12-04 LAB
CULTURE RESULTS: SIGNIFICANT CHANGE UP
SPECIMEN SOURCE: SIGNIFICANT CHANGE UP

## 2024-12-10 ENCOUNTER — APPOINTMENT (OUTPATIENT)
Dept: UROLOGY | Facility: CLINIC | Age: 79
End: 2024-12-10

## 2024-12-11 ENCOUNTER — TRANSCRIPTION ENCOUNTER (OUTPATIENT)
Age: 79
End: 2024-12-11

## 2024-12-11 ENCOUNTER — APPOINTMENT (OUTPATIENT)
Dept: UROLOGY | Facility: HOSPITAL | Age: 79
End: 2024-12-11

## 2024-12-11 ENCOUNTER — RESULT REVIEW (OUTPATIENT)
Age: 79
End: 2024-12-11

## 2024-12-11 ENCOUNTER — OUTPATIENT (OUTPATIENT)
Dept: INPATIENT UNIT | Facility: HOSPITAL | Age: 79
LOS: 1 days | Discharge: HOME | End: 2024-12-11
Payer: MEDICARE

## 2024-12-11 VITALS
HEART RATE: 56 BPM | SYSTOLIC BLOOD PRESSURE: 123 MMHG | RESPIRATION RATE: 18 BRPM | TEMPERATURE: 98 F | OXYGEN SATURATION: 98 % | DIASTOLIC BLOOD PRESSURE: 67 MMHG

## 2024-12-11 VITALS
SYSTOLIC BLOOD PRESSURE: 138 MMHG | HEIGHT: 70 IN | OXYGEN SATURATION: 98 % | DIASTOLIC BLOOD PRESSURE: 82 MMHG | TEMPERATURE: 98 F | HEART RATE: 70 BPM | RESPIRATION RATE: 16 BRPM | WEIGHT: 152.12 LBS

## 2024-12-11 DIAGNOSIS — Z98.890 OTHER SPECIFIED POSTPROCEDURAL STATES: Chronic | ICD-10-CM

## 2024-12-11 DIAGNOSIS — N20.0 CALCULUS OF KIDNEY: ICD-10-CM

## 2024-12-11 DIAGNOSIS — Z90.89 ACQUIRED ABSENCE OF OTHER ORGANS: Chronic | ICD-10-CM

## 2024-12-11 LAB — SURGICAL PATHOLOGY STUDY: SIGNIFICANT CHANGE UP

## 2024-12-11 PROCEDURE — 88300 SURGICAL PATH GROSS: CPT | Mod: 26

## 2024-12-11 PROCEDURE — 52356 CYSTO/URETERO W/LITHOTRIPSY: CPT | Mod: 50

## 2024-12-11 RX ORDER — ROSUVASTATIN CALCIUM 5 MG/1
0 TABLET, FILM COATED ORAL
Refills: 0 | DISCHARGE

## 2024-12-11 RX ORDER — PHENAZOPYRIDINE HCL 200 MG
200 TABLET ORAL ONCE
Refills: 0 | Status: COMPLETED | OUTPATIENT
Start: 2024-12-11 | End: 2024-12-11

## 2024-12-11 RX ORDER — OXYBUTYNIN CHLORIDE 5 MG
5 TABLET ORAL ONCE
Refills: 0 | Status: COMPLETED | OUTPATIENT
Start: 2024-12-11 | End: 2024-12-11

## 2024-12-11 RX ORDER — ONDANSETRON HYDROCHLORIDE 4 MG/1
4 TABLET, FILM COATED ORAL ONCE
Refills: 0 | Status: DISCONTINUED | OUTPATIENT
Start: 2024-12-11 | End: 2024-12-11

## 2024-12-11 RX ORDER — OXYCODONE HYDROCHLORIDE 30 MG/1
5 TABLET ORAL ONCE
Refills: 0 | Status: DISCONTINUED | OUTPATIENT
Start: 2024-12-11 | End: 2024-12-11

## 2024-12-11 RX ORDER — OXYBUTYNIN CHLORIDE 5 MG
1 TABLET ORAL
Qty: 21 | Refills: 0
Start: 2024-12-11 | End: 2024-12-17

## 2024-12-11 RX ORDER — TAMSULOSIN HYDROCHLORIDE 0.4 MG/1
1 CAPSULE ORAL
Qty: 30 | Refills: 3
Start: 2024-12-11 | End: 2025-04-09

## 2024-12-11 RX ORDER — TRAMADOL HYDROCHLORIDE 300 MG/1
1 CAPSULE ORAL
Qty: 12 | Refills: 0
Start: 2024-12-11 | End: 2024-12-13

## 2024-12-11 RX ORDER — 0.9 % SODIUM CHLORIDE 0.9 %
1000 INTRAVENOUS SOLUTION INTRAVENOUS
Refills: 0 | Status: DISCONTINUED | OUTPATIENT
Start: 2024-12-11 | End: 2024-12-11

## 2024-12-11 RX ORDER — PHENAZOPYRIDINE HCL 200 MG
1 TABLET ORAL
Qty: 9 | Refills: 0
Start: 2024-12-11 | End: 2024-12-13

## 2024-12-11 RX ORDER — TAMSULOSIN HYDROCHLORIDE 0.4 MG/1
1 CAPSULE ORAL
Qty: 30 | Refills: 3 | DISCHARGE
Start: 2024-12-11

## 2024-12-11 RX ORDER — FENTANYL 12 UG/H
50 PATCH, EXTENDED RELEASE TRANSDERMAL
Refills: 0 | Status: DISCONTINUED | OUTPATIENT
Start: 2024-12-11 | End: 2024-12-11

## 2024-12-11 RX ADMIN — OXYCODONE HYDROCHLORIDE 5 MILLIGRAM(S): 30 TABLET ORAL at 11:37

## 2024-12-11 RX ADMIN — Medication 5 MILLIGRAM(S): at 11:03

## 2024-12-11 RX ADMIN — OXYCODONE HYDROCHLORIDE 5 MILLIGRAM(S): 30 TABLET ORAL at 11:07

## 2024-12-11 RX ADMIN — Medication 200 MILLIGRAM(S): at 11:04

## 2024-12-11 NOTE — ASU DISCHARGE PLAN (ADULT/PEDIATRIC) - NS MD DC FALL RISK RISK
For information on Fall & Injury Prevention, visit: [email or web address] OR  [email or web address] OR  https://www.cdc.gov/steadi/patient.html

## 2024-12-11 NOTE — ASU PATIENT PROFILE, ADULT - NSICDXPASTMEDICALHX_GEN_ALL_CORE_FT
PAST MEDICAL HISTORY:  Hematuria     HTN (hypertension)     Hypercholesterolemia     Inguinal hernia     Kidney stones

## 2024-12-11 NOTE — BRIEF OPERATIVE NOTE - NSICDXBRIEFPROCEDURE_GEN_ALL_CORE_FT
PROCEDURES:  Ureteroscopy with laser lithotripsy and stent placement 11-Dec-2024 10:53:38 BILATERAL Kaushik Car

## 2024-12-11 NOTE — ASU DISCHARGE PLAN (ADULT/PEDIATRIC) - CARE PROVIDER_API CALL
Kaushik Car Michael  Urology  [address]  [place] [address]  Phone: [phone]  Fax: [phone]  Follow Up Time: 1 week

## 2024-12-11 NOTE — ASU DISCHARGE PLAN (ADULT/PEDIATRIC) - PROCEDURE
Cystoscopy, bilateral ureteroscopy, laser lithotripsy, RIGHT basket stone extraction, bilateral ureteral stent placement

## 2024-12-11 NOTE — ASU DISCHARGE PLAN (ADULT/PEDIATRIC) - FINANCIAL ASSISTANCE
Ambulatory Care Coordination Note  3/20/2024      ACM attempted to contact the patient by telephone. Patient's voice mail is full.  Writer phoned Patient's Domestic Partner, Ms. Yarbrough, and left message on her voice mail.  Writer request return call from Patient.  Writer's contact information provided.    Method of communication with provider: staff message.    ACM: Robin Perry RN    CC Plan:       Review medications with Patient     2.   Patient receives home Physical therapy.     3.  Patient kept new patient appointment with Dr. Tracy, Cardiology, on 2/9/2024.   Dr. Tracy's plan of care was reviewed.  Writer plans to review with Patient or caregiver today:     Patient reports intermittent chest pain and shortness of breath on exertion, therefore, will obtain nuclear stress test as patient is unable to run on the treadmill due to underlying Parkinson's disease and using walking stick.  Will also obtain an echocardiogram to check for degree of aortic regurgitation is present on  previous echo.        4.  Patient missed appointment with PCP on 3/18/2024.  Plan to reschedule appointment.    5.  Patient presented to ED on 3/6/24 with chest pain.  He was admitted.  He was discharged on 3/7/24, Discharge Diagnosis:     Acute chest pain due to nonischemic causes  Improved with rest    Offered patient enrollment in the Remote Patient Monitoring (RPM) program for in-home monitoring: Patient is not eligible for RPM program.        Lab Results       None            Care Coordination Interventions    Referral from Primary Care Provider: Yes  Suggested Interventions and Community Resources  Fall Risk Prevention: In Process  Disease Specific Clinic: Completed (Comment: Dr. Ross Garcia, GI;  Dr. Chuy Bailey, Pain Mangement; Dr. Lobito Tracy, Cardiologist; Slime Baker, APRN-[facility], Neurology)  Home Care Waiver: Completed  Home Health Services: Completed (Comment: Elba from [facility] visits Patient weekly)  Meals  [facility] provides services at a reduced cost to those who are determined to be eligible through [facility]’s financial assistance program. Information regarding [facility]’s financial assistance program can be found by going to [email or web address] or by calling [phone].

## 2024-12-11 NOTE — BRIEF OPERATIVE NOTE - OPERATION/FINDINGS
stone impacted in RIGHT UPJ. Fragmented with laser. some stone extracted with basket. LEFT midpole calyceal stone dusted with laser. Both UOs stenotic so stents left.

## 2024-12-13 ENCOUNTER — INPATIENT (INPATIENT)
Facility: HOSPITAL | Age: 79
LOS: 2 days | Discharge: HOME | DRG: 690 | End: 2024-12-16
Attending: INTERNAL MEDICINE | Admitting: STUDENT IN AN ORGANIZED HEALTH CARE EDUCATION/TRAINING PROGRAM
Payer: MEDICARE

## 2024-12-13 VITALS
RESPIRATION RATE: 18 BRPM | DIASTOLIC BLOOD PRESSURE: 60 MMHG | OXYGEN SATURATION: 98 % | HEART RATE: 55 BPM | TEMPERATURE: 98 F | SYSTOLIC BLOOD PRESSURE: 100 MMHG | HEIGHT: 70 IN

## 2024-12-13 DIAGNOSIS — R33.9 RETENTION OF URINE, UNSPECIFIED: ICD-10-CM

## 2024-12-13 DIAGNOSIS — Z98.890 OTHER SPECIFIED POSTPROCEDURAL STATES: Chronic | ICD-10-CM

## 2024-12-13 DIAGNOSIS — N17.9 ACUTE KIDNEY FAILURE, UNSPECIFIED: ICD-10-CM

## 2024-12-13 DIAGNOSIS — K59.00 CONSTIPATION, UNSPECIFIED: ICD-10-CM

## 2024-12-13 DIAGNOSIS — D64.9 ANEMIA, UNSPECIFIED: ICD-10-CM

## 2024-12-13 DIAGNOSIS — I95.9 HYPOTENSION, UNSPECIFIED: ICD-10-CM

## 2024-12-13 DIAGNOSIS — Z90.89 ACQUIRED ABSENCE OF OTHER ORGANS: Chronic | ICD-10-CM

## 2024-12-13 DIAGNOSIS — E78.5 HYPERLIPIDEMIA, UNSPECIFIED: ICD-10-CM

## 2024-12-13 DIAGNOSIS — I10 ESSENTIAL (PRIMARY) HYPERTENSION: ICD-10-CM

## 2024-12-13 DIAGNOSIS — N13.30 UNSPECIFIED HYDRONEPHROSIS: ICD-10-CM

## 2024-12-13 DIAGNOSIS — R31.0 GROSS HEMATURIA: ICD-10-CM

## 2024-12-13 DIAGNOSIS — R30.0 DYSURIA: ICD-10-CM

## 2024-12-13 DIAGNOSIS — N39.0 URINARY TRACT INFECTION, SITE NOT SPECIFIED: ICD-10-CM

## 2024-12-13 PROBLEM — N20.0 CALCULUS OF KIDNEY: Chronic | Status: ACTIVE | Noted: 2024-12-03

## 2024-12-13 PROBLEM — R31.9 HEMATURIA, UNSPECIFIED: Chronic | Status: ACTIVE | Noted: 2024-12-03

## 2024-12-13 LAB
ALBUMIN SERPL ELPH-MCNC: 3.8 G/DL — SIGNIFICANT CHANGE UP (ref 3.3–5)
ALP SERPL-CCNC: 64 U/L — SIGNIFICANT CHANGE UP (ref 40–120)
ALT FLD-CCNC: 15 U/L — SIGNIFICANT CHANGE UP (ref 12–78)
ANION GAP SERPL CALC-SCNC: 8 MMOL/L — SIGNIFICANT CHANGE UP (ref 5–17)
APPEARANCE UR: ABNORMAL
APPEARANCE UR: ABNORMAL
AST SERPL-CCNC: 23 U/L — SIGNIFICANT CHANGE UP (ref 15–37)
BACTERIA # UR AUTO: NEGATIVE /HPF — SIGNIFICANT CHANGE UP
BACTERIA # UR AUTO: NEGATIVE /HPF — SIGNIFICANT CHANGE UP
BASOPHILS # BLD AUTO: 0.03 K/UL — SIGNIFICANT CHANGE UP (ref 0–0.2)
BASOPHILS NFR BLD AUTO: 0.4 % — SIGNIFICANT CHANGE UP (ref 0–2)
BILIRUB SERPL-MCNC: 0.9 MG/DL — SIGNIFICANT CHANGE UP (ref 0.2–1.2)
BILIRUB UR-MCNC: ABNORMAL
BILIRUB UR-MCNC: ABNORMAL
BUN SERPL-MCNC: 57 MG/DL — HIGH (ref 7–23)
CALCIUM SERPL-MCNC: 8.4 MG/DL — LOW (ref 8.5–10.1)
CAST: 0 /LPF — SIGNIFICANT CHANGE UP (ref 0–4)
CAST: 1 /LPF — SIGNIFICANT CHANGE UP (ref 0–4)
CHLORIDE SERPL-SCNC: 97 MMOL/L — SIGNIFICANT CHANGE UP (ref 96–108)
CO2 SERPL-SCNC: 24 MMOL/L — SIGNIFICANT CHANGE UP (ref 22–31)
COLOR SPEC: ABNORMAL
COLOR SPEC: ABNORMAL
CREAT ?TM UR-MCNC: 21 MG/DL — SIGNIFICANT CHANGE UP
CREAT SERPL-MCNC: 3.22 MG/DL — HIGH (ref 0.5–1.3)
DIFF PNL FLD: ABNORMAL
DIFF PNL FLD: ABNORMAL
EGFR: 19 ML/MIN/1.73M2 — LOW
EOSINOPHIL # BLD AUTO: 0.19 K/UL — SIGNIFICANT CHANGE UP (ref 0–0.5)
EOSINOPHIL NFR BLD AUTO: 2.5 % — SIGNIFICANT CHANGE UP (ref 0–6)
GLUCOSE SERPL-MCNC: 98 MG/DL — SIGNIFICANT CHANGE UP (ref 70–99)
GLUCOSE UR QL: NEGATIVE MG/DL — SIGNIFICANT CHANGE UP
GLUCOSE UR QL: NEGATIVE MG/DL — SIGNIFICANT CHANGE UP
HCT VFR BLD CALC: 33 % — LOW (ref 39–50)
HCT VFR BLD CALC: 35.3 % — LOW (ref 39–50)
HGB BLD-MCNC: 11.1 G/DL — LOW (ref 13–17)
HGB BLD-MCNC: 12.1 G/DL — LOW (ref 13–17)
IMM GRANULOCYTES NFR BLD AUTO: 0.4 % — SIGNIFICANT CHANGE UP (ref 0–0.9)
KETONES UR-MCNC: NEGATIVE MG/DL — SIGNIFICANT CHANGE UP
KETONES UR-MCNC: NEGATIVE MG/DL — SIGNIFICANT CHANGE UP
LEUKOCYTE ESTERASE UR-ACNC: ABNORMAL
LEUKOCYTE ESTERASE UR-ACNC: ABNORMAL
LYMPHOCYTES # BLD AUTO: 0.84 K/UL — LOW (ref 1–3.3)
LYMPHOCYTES # BLD AUTO: 11.1 % — LOW (ref 13–44)
MAGNESIUM SERPL-MCNC: 2.1 MG/DL — SIGNIFICANT CHANGE UP (ref 1.6–2.6)
MCHC RBC-ENTMCNC: 31.5 PG — SIGNIFICANT CHANGE UP (ref 27–34)
MCHC RBC-ENTMCNC: 34.3 G/DL — SIGNIFICANT CHANGE UP (ref 32–36)
MCV RBC AUTO: 91.9 FL — SIGNIFICANT CHANGE UP (ref 80–100)
MONOCYTES # BLD AUTO: 0.82 K/UL — SIGNIFICANT CHANGE UP (ref 0–0.9)
MONOCYTES NFR BLD AUTO: 10.9 % — SIGNIFICANT CHANGE UP (ref 2–14)
NEUTROPHILS # BLD AUTO: 5.64 K/UL — SIGNIFICANT CHANGE UP (ref 1.8–7.4)
NEUTROPHILS NFR BLD AUTO: 74.7 % — SIGNIFICANT CHANGE UP (ref 43–77)
NITRITE UR-MCNC: POSITIVE
NITRITE UR-MCNC: POSITIVE
PH UR: 5 — SIGNIFICANT CHANGE UP (ref 5–8)
PH UR: 5 — SIGNIFICANT CHANGE UP (ref 5–8)
PLATELET # BLD AUTO: 209 K/UL — SIGNIFICANT CHANGE UP (ref 150–400)
POTASSIUM SERPL-MCNC: 3.6 MMOL/L — SIGNIFICANT CHANGE UP (ref 3.5–5.3)
POTASSIUM SERPL-SCNC: 3.6 MMOL/L — SIGNIFICANT CHANGE UP (ref 3.5–5.3)
PROT ?TM UR-MCNC: 124 MG/DL — HIGH (ref 0–12)
PROT SERPL-MCNC: 7.5 GM/DL — SIGNIFICANT CHANGE UP (ref 6–8.3)
PROT UR-MCNC: 100 MG/DL
PROT UR-MCNC: 300 MG/DL
PROT/CREAT UR-RTO: 5.9 RATIO — HIGH (ref 0–0.2)
RBC # BLD: 3.84 M/UL — LOW (ref 4.2–5.8)
RBC # FLD: 12 % — SIGNIFICANT CHANGE UP (ref 10.3–14.5)
RBC CASTS # UR COMP ASSIST: 180 /HPF — HIGH (ref 0–4)
RBC CASTS # UR COMP ASSIST: 826 /HPF — HIGH (ref 0–4)
SODIUM SERPL-SCNC: 129 MMOL/L — LOW (ref 135–145)
SP GR SPEC: 1.01 — SIGNIFICANT CHANGE UP (ref 1–1.03)
SP GR SPEC: 1.01 — SIGNIFICANT CHANGE UP (ref 1–1.03)
SQUAMOUS # UR AUTO: 1 /HPF — SIGNIFICANT CHANGE UP (ref 0–5)
SQUAMOUS # UR AUTO: 5 /HPF — SIGNIFICANT CHANGE UP (ref 0–5)
UROBILINOGEN FLD QL: 1 MG/DL — SIGNIFICANT CHANGE UP (ref 0.2–1)
UROBILINOGEN FLD QL: 1 MG/DL — SIGNIFICANT CHANGE UP (ref 0.2–1)
WBC # BLD: 7.55 K/UL — SIGNIFICANT CHANGE UP (ref 3.8–10.5)
WBC # FLD AUTO: 7.55 K/UL — SIGNIFICANT CHANGE UP (ref 3.8–10.5)
WBC UR QL: 25 /HPF — HIGH (ref 0–5)
WBC UR QL: 66 /HPF — HIGH (ref 0–5)

## 2024-12-13 PROCEDURE — 82570 ASSAY OF URINE CREATININE: CPT

## 2024-12-13 PROCEDURE — 99223 1ST HOSP IP/OBS HIGH 75: CPT

## 2024-12-13 PROCEDURE — 81001 URINALYSIS AUTO W/SCOPE: CPT

## 2024-12-13 PROCEDURE — 84540 ASSAY OF URINE/UREA-N: CPT

## 2024-12-13 PROCEDURE — 85025 COMPLETE CBC W/AUTO DIFF WBC: CPT

## 2024-12-13 PROCEDURE — 74176 CT ABD & PELVIS W/O CONTRAST: CPT | Mod: 26,MC

## 2024-12-13 PROCEDURE — 85014 HEMATOCRIT: CPT

## 2024-12-13 PROCEDURE — 84300 ASSAY OF URINE SODIUM: CPT

## 2024-12-13 PROCEDURE — 84133 ASSAY OF URINE POTASSIUM: CPT

## 2024-12-13 PROCEDURE — 93010 ELECTROCARDIOGRAM REPORT: CPT

## 2024-12-13 PROCEDURE — 99285 EMERGENCY DEPT VISIT HI MDM: CPT

## 2024-12-13 PROCEDURE — 36415 COLL VENOUS BLD VENIPUNCTURE: CPT

## 2024-12-13 PROCEDURE — 83935 ASSAY OF URINE OSMOLALITY: CPT

## 2024-12-13 PROCEDURE — 84156 ASSAY OF PROTEIN URINE: CPT

## 2024-12-13 PROCEDURE — 80048 BASIC METABOLIC PNL TOTAL CA: CPT

## 2024-12-13 PROCEDURE — 85018 HEMOGLOBIN: CPT

## 2024-12-13 PROCEDURE — 93970 EXTREMITY STUDY: CPT | Mod: 26

## 2024-12-13 RX ORDER — SODIUM CHLORIDE 9 MG/ML
1000 INJECTION, SOLUTION INTRAMUSCULAR; INTRAVENOUS; SUBCUTANEOUS ONCE
Refills: 0 | Status: COMPLETED | OUTPATIENT
Start: 2024-12-13 | End: 2024-12-13

## 2024-12-13 RX ORDER — ACETAMINOPHEN 500MG 500 MG/1
1000 TABLET, COATED ORAL ONCE
Refills: 0 | Status: COMPLETED | OUTPATIENT
Start: 2024-12-13 | End: 2024-12-13

## 2024-12-13 RX ORDER — SENNOSIDES 8.6 MG
2 TABLET ORAL AT BEDTIME
Refills: 0 | Status: DISCONTINUED | OUTPATIENT
Start: 2024-12-13 | End: 2024-12-16

## 2024-12-13 RX ORDER — OXYBUTYNIN CHLORIDE 5 MG
5 TABLET ORAL THREE TIMES A DAY
Refills: 0 | Status: DISCONTINUED | OUTPATIENT
Start: 2024-12-13 | End: 2024-12-16

## 2024-12-13 RX ORDER — CEFTRIAXONE SODIUM 1 G
1000 VIAL (EA) INJECTION EVERY 24 HOURS
Refills: 0 | Status: DISCONTINUED | OUTPATIENT
Start: 2024-12-14 | End: 2024-12-16

## 2024-12-13 RX ORDER — CEFTRIAXONE SODIUM 1 G
1000 VIAL (EA) INJECTION ONCE
Refills: 0 | Status: COMPLETED | OUTPATIENT
Start: 2024-12-13 | End: 2024-12-13

## 2024-12-13 RX ORDER — ROSUVASTATIN CALCIUM 5 MG/1
1 TABLET, FILM COATED ORAL
Refills: 0 | DISCHARGE

## 2024-12-13 RX ORDER — TRAMADOL HYDROCHLORIDE 300 MG/1
50 CAPSULE ORAL EVERY 6 HOURS
Refills: 0 | Status: DISCONTINUED | OUTPATIENT
Start: 2024-12-13 | End: 2024-12-16

## 2024-12-13 RX ORDER — SODIUM CHLORIDE 9 MG/ML
1200 INJECTION, SOLUTION INTRAMUSCULAR; INTRAVENOUS; SUBCUTANEOUS ONCE
Refills: 0 | Status: COMPLETED | OUTPATIENT
Start: 2024-12-13 | End: 2024-12-13

## 2024-12-13 RX ORDER — CEFTRIAXONE SODIUM 1 G
1000 VIAL (EA) INJECTION ONCE
Refills: 0 | Status: DISCONTINUED | OUTPATIENT
Start: 2024-12-13 | End: 2024-12-13

## 2024-12-13 RX ORDER — PHENAZOPYRIDINE HCL 200 MG
200 TABLET ORAL THREE TIMES A DAY
Refills: 0 | Status: DISCONTINUED | OUTPATIENT
Start: 2024-12-13 | End: 2024-12-16

## 2024-12-13 RX ORDER — ROSUVASTATIN CALCIUM 5 MG/1
5 TABLET, FILM COATED ORAL AT BEDTIME
Refills: 0 | Status: DISCONTINUED | OUTPATIENT
Start: 2024-12-13 | End: 2024-12-16

## 2024-12-13 RX ORDER — SODIUM CHLORIDE 9 MG/ML
1000 INJECTION, SOLUTION INTRAMUSCULAR; INTRAVENOUS; SUBCUTANEOUS
Refills: 0 | Status: DISCONTINUED | OUTPATIENT
Start: 2024-12-13 | End: 2024-12-14

## 2024-12-13 RX ORDER — ACETAMINOPHEN 500MG 500 MG/1
650 TABLET, COATED ORAL EVERY 6 HOURS
Refills: 0 | Status: DISCONTINUED | OUTPATIENT
Start: 2024-12-13 | End: 2024-12-16

## 2024-12-13 RX ORDER — TAMSULOSIN HYDROCHLORIDE 0.4 MG/1
0.4 CAPSULE ORAL AT BEDTIME
Refills: 0 | Status: DISCONTINUED | OUTPATIENT
Start: 2024-12-13 | End: 2024-12-16

## 2024-12-13 RX ADMIN — Medication 2 TABLET(S): at 23:18

## 2024-12-13 RX ADMIN — ACETAMINOPHEN 500MG 1000 MILLIGRAM(S): 500 TABLET, COATED ORAL at 19:55

## 2024-12-13 RX ADMIN — ACETAMINOPHEN 500MG 400 MILLIGRAM(S): 500 TABLET, COATED ORAL at 16:19

## 2024-12-13 RX ADMIN — TAMSULOSIN HYDROCHLORIDE 0.4 MILLIGRAM(S): 0.4 CAPSULE ORAL at 23:18

## 2024-12-13 RX ADMIN — SODIUM CHLORIDE 2000 MILLILITER(S): 9 INJECTION, SOLUTION INTRAMUSCULAR; INTRAVENOUS; SUBCUTANEOUS at 15:17

## 2024-12-13 RX ADMIN — Medication 5 MILLIGRAM(S): at 23:18

## 2024-12-13 RX ADMIN — Medication 2 MILLIGRAM(S): at 19:55

## 2024-12-13 RX ADMIN — Medication 1000 MILLIGRAM(S): at 16:25

## 2024-12-13 RX ADMIN — Medication 2 MILLIGRAM(S): at 16:19

## 2024-12-13 RX ADMIN — SODIUM CHLORIDE 1000 MILLILITER(S): 9 INJECTION, SOLUTION INTRAMUSCULAR; INTRAVENOUS; SUBCUTANEOUS at 18:23

## 2024-12-13 RX ADMIN — ROSUVASTATIN CALCIUM 5 MILLIGRAM(S): 5 TABLET, FILM COATED ORAL at 23:17

## 2024-12-13 RX ADMIN — ACETAMINOPHEN 500MG 1000 MILLIGRAM(S): 500 TABLET, COATED ORAL at 21:04

## 2024-12-13 RX ADMIN — Medication 200 MILLIGRAM(S): at 20:32

## 2024-12-13 RX ADMIN — Medication 5 MILLIGRAM(S): at 20:32

## 2024-12-13 RX ADMIN — SODIUM CHLORIDE 60 MILLILITER(S): 9 INJECTION, SOLUTION INTRAMUSCULAR; INTRAVENOUS; SUBCUTANEOUS at 20:34

## 2024-12-13 RX ADMIN — SODIUM CHLORIDE 1200 MILLILITER(S): 9 INJECTION, SOLUTION INTRAMUSCULAR; INTRAVENOUS; SUBCUTANEOUS at 16:20

## 2024-12-13 RX ADMIN — ACETAMINOPHEN 500MG 400 MILLIGRAM(S): 500 TABLET, COATED ORAL at 21:00

## 2024-12-13 NOTE — ED PROVIDER NOTE - PHYSICAL EXAMINATION
Constitutional: NAD, alert, verbal  HEENT: NCAT, EOMi, PERRL  Cardiac: RRR no MRG  Resp: clear, no wheezing or crackles  GI: ab soft ntnd, no r/g, no cva ttp  MSK/Ext: trace edema to RLE, no calf tenderness  Neuro: MCMANUS  Skin: No rashes

## 2024-12-13 NOTE — ED PROVIDER NOTE - CARE PLAN
Principal Discharge DX:	Urinary retention  Secondary Diagnosis:	[place] (acute kidney injury)  Secondary Diagnosis:	Acute UTI   1

## 2024-12-13 NOTE — H&P ADULT - NSHPPHYSICALEXAM_GEN_ALL_CORE
Vital Signs Last 24 Hrs  T(C): 36.6 (13 Dec 2024 16:56), Max: 36.6 (13 Dec 2024 16:56)  T(F): 97.9 (13 Dec 2024 16:56), Max: 97.9 (13 Dec 2024 16:56)  HR: 61 (13 Dec 2024 15:20) (55 - 61)  BP: 117/75 (13 Dec 2024 19:14) (81/54 - 117/75)  BP(mean): 89 (13 Dec 2024 19:14) (64 - 89)  RR: 18 (13 Dec 2024 15:20) (18 - 18)  SpO2: 98% (13 Dec 2024 15:20) (98% - 98%)    Parameters below as of 13 Dec 2024 15:20  Patient On (Oxygen Delivery Method): room air    GENERAL:  no acute distress  EYES: sclera clear, EOM intact, PERRLA, no exudates  ENMT: normocephalic, atraumatic, moist mucous membranes  NECK: supple, soft  LUNGS: good air entry bilaterally, clear to auscultation, symmetric breath sounds, no wheezing or rhonchi appreciated  HEART: soft S1/S2, regular rate and rhythm, no murmurs noted, no lower extremity edema  GASTROINTESTINAL: abdomen is soft, nontender, + distended, normoactive bowel sounds + flank pian b/l mild   INTEGUMENT: good skin turgor, no lesions noted  MUSCULOSKELETAL: no cyanosis, clubbing, edema, calves nontender to palpation b/l  NEUROLOGIC: awake, alert, oriented x3, good muscle tone in 4 extremities, no obvious sensory deficits  PSYCHIATRIC: mood is good, affect is congruent, linear and logical thought process

## 2024-12-13 NOTE — CONSULT NOTE ADULT - ASSESSMENT
80 Y/O M h/o B/L history of bilateral kidney stones status post bilateral stent placement 12/11. A/w UTI, Terrell and urinary retention   - Admit to medicine  - IV Abx  - Mas cath  - Urology will follow  - D/W Dr. Car

## 2024-12-13 NOTE — ED ADULT NURSE REASSESSMENT NOTE - NS ED NURSE REASSESS COMMENT FT1
Pt received from ED RN. Pt resting in ED stretcher A+Ox4, NAD, VSS. Pt tolerating PO food. Family at bedside. Mas draining clear dark yellow void. 1000ml removed from bag. Respirations are equal and unlabored. C/o 5/10 flank pain at this time. Denies cp, sob, N/V. Pt profile completed. Pt and family updated on plan of care. Awaiting bed assignment and further orders. Care continues as ordered. Fall and safety precautions maintained. Call bell within reach. All questions answered.

## 2024-12-13 NOTE — PATIENT PROFILE ADULT - FUNCTIONAL ASSESSMENT - BASIC MOBILITY 3.

## 2024-12-13 NOTE — ED ADULT TRIAGE NOTE - CHIEF COMPLAINT QUOTE
Pt presents to ED c/o hematuria, b/l flank pain, right leg swelling and hypotension sent in by MD Doyle for complications after kidney stent placement on 12/11. As per wife bp at office 80/30. No blood thinners. Pt denies chest pain, dizziness or n/v/d. Sent for EKG.

## 2024-12-13 NOTE — ED ADULT NURSE NOTE - OBJECTIVE STATEMENT
pt ambulatory to ed from pcp office. pt c/o bl flank pain, hematuria, and confusion. pt also observed right leg swelling as of today. pt recently received procedure to remove 14 mm kidney stone and had 2 ureter stents placed. pt states he "has not been right" isnce. procedure was 12/11. pt denies chest pain, sob, fevers, n/v/d. wife at bedside. a&o x4. ekg done. no further complaints, piv inserted and blood sent to lab. within normal limits

## 2024-12-13 NOTE — CONSULT NOTE ADULT - CONSULT REQUESTED BY NAME
Requested Prescriptions     Pending Prescriptions Disp Refills   • HYDROCHLOROTHIAZIDE 25 MG Oral Tab [Pharmacy Med Name: HYDROCHLOROTHIAZIDE 25MG TABLETS] 90 tablet 1     Sig: TAKE 1 TABLET(25 MG) BY MOUTH EVERY DAY     LOV 6/19/2020     Patient was asked Dr. Reeves

## 2024-12-13 NOTE — H&P ADULT - HISTORY OF PRESENT ILLNESS
79-yo F w/ PMHx of  bilateral kidney stones s/p bilateral stent placement by urologist Dr. Car on 12/ 11 presenting with hematuria and dysuria starting yesterday.  Patient was seen by his PCP Dr. Doyle today in office when he was complaining of same and noted to have a low blood pressure systolic of 80s.  Patient also complaining of right lower extremity swelling.  Patient referred to the ER for evaluation.  Patient states he was discharged and taking his medications as prescribed when he started developing the need to urinate.  + Blood in urine   Endorsing stable bilateral flank pain. Denies fevers chills nausea vomiting.    Mas was placed in ER by urology.  DARK  blood in the bag, urine start to clear after IV bolus NS    IN ED  /60 --> 81/54  HR 55 RR 18 temp 97 F --> 96 f on RA   H/H: 12.1/35.3  UA + turbid protein 300 large blood small bili large LE + nitrite wbc 66    BUN/Cr: 57/ 3.2  last cr 1.75     CT A/P: Interval placement of bilateral nephroureteral stents with positioning as above. New mild bilateral perinephric and periureteral stranding, slightly increased on the right, with diffuse urothelial thickening. Mild bilateral hydronephrosis despite presence of stents and no obstructing ureteral calculus    doppler LE U/S: No evidence of deep venous thrombosis in either lower extremity.    s/p 1x Rocephin x1 iv OFIRMEV x1, morphine 2mg x1 3200 NS in ER

## 2024-12-13 NOTE — ED PROVIDER NOTE - PROGRESS NOTE DETAILS
spoke with urology rah lyn regarding patient complaint and recent gu procedure Dr. Car came to see patient in Ed. states thinks patient is retaining w/ backup into kidneys. patient bladder scanned after urinary retention and had 500 cc. payne ordered and placed. cr. 3s. S/o given to Dr. Madelyn Reeves. patient to be admitted for urinary retention and ORLANDO. +UTI. given ceftriaxone.

## 2024-12-13 NOTE — ED PROVIDER NOTE - CLINICAL SUMMARY MEDICAL DECISION MAKING FREE TEXT BOX
[age]-year-old male with a history of bilateral kidney stones status post bilateral stent placement by urologist Dr. Car on December 11 presenting with hematuria and dysuria starting yesterday.  Patient was seen by his PCP Dr. Doyle today in office when he was complaining of same and noted to have a low blood pressure systolic of 80s.  Patient also complaining of right lower extremity swelling.  Patient referred to the ER for evaluation.  Patient states he was discharged and taking his medications as prescribed when he started developing the need to urinate.  Blood in urine seen.  Denies fevers chills nausea vomiting.  Endorsing stable bilateral flank pain.  Taking Tylenol at home.  Patient did not take tramadol today.  Review of systems otherwise negative    Vital signs reviewed systolic blood pressure in the 100s currently afebrile heart rate normal  On exam his abdomen is soft there is no CVA tenderness otherwise well-appearing right lower extremity with mild swelling but no calf tenderness    Rule out infection,  postop problem, DVT  Urology consult  Labs  UA with culture  1 L fluids  CT of the abdomen  Reassess

## 2024-12-13 NOTE — H&P ADULT - ASSESSMENT
79-yo F w/ PMHx of  bilateral kidney stones s/p bilateral stent placement by urologist Dr. Car on 12/ 11 presenting with hematuria and dysuria starting yesterday.  Patient was seen by his PCP Dr. Doyle today in office when he was complaining of same and noted to have a low blood pressure systolic of 80s.  Patient also complaining of right lower extremity swelling.       # Developing sepsis 2/2 UTI  # bilateral kidney stones s/p bilateral stent placement 12/ 11  # Hydronephrosis   - mild flank pain   - UA + turbid protein 300 large blood small bili large LE + nitrite wbc 66    - hypotensive in ER   - CT A/P: Interval placement of bilateral nephroureteral stents with positioning as above. New mild bilateral perinephric and periureteral stranding, slightly increased on the right, with diffuse urothelial thickening. Mild bilateral hydronephrosis despite presence of stents and no obstructing ureteral calculus  - s/p 1x Rocephin x1 iv OFIRMEV x1, morphine 2mg x1 3200 NS in ER  - Mas was placed in ER by urology.  DARK  blood in the bag, urine start to clear after IV bolus NS  - Maintenance fluid   - will continue Rocephin for now and culture come back   - f/u urine and blood culture     # OR[place]O   - BUN/Cr: 57/ 3.2  last cr 1.75   - likely prerenal azotemia from decreased PO intake   - NS at rate of 60 cc/hr x 20 hours   - check urine lytes  - Avoid nephrotoxic agents.  - F/u CMP in AM    # Anemia   - H/H: 12.1/35.3  - baseline 13  - STAT hb   - Monitor for signs/symptoms of bleeding  - Monitor daily CBC.    # LE edema  -  doppler LE U/S: No evidence of deep venous thrombosis in either lower extremity.  - stable     # HTN  - at home Ramipril , HCTZ and atenolol   - bp labile resume when able     # HLD   Continue statin     # Constipation   - at home senna , Dulcolax     # DVT pro   - SCD's for now

## 2024-12-13 NOTE — PATIENT PROFILE ADULT - FALL HARM RISK - UNIVERSAL INTERVENTIONS
Bed in lowest position, wheels locked, appropriate side rails in place/Call bell, personal items and telephone in reach/Instruct patient to call for assistance before getting out of bed or chair/Non-slip footwear when patient is out of bed/[place] to call system/Physically safe environment - no spills, clutter or unnecessary equipment/Purposeful Proactive Rounding/Room/bathroom lighting operational, light cord in reach

## 2024-12-13 NOTE — PATIENT PROFILE ADULT - FUNCTIONAL ASSESSMENT - DAILY ACTIVITY 3.
[MRI] : MRI [Left] : left [Knee] : knee [Report was reviewed and noted in the chart] : The report was reviewed and noted in the chart [I reviewed the films/CD and agree] : I reviewed the films/CD and agree 4 = No assist / stand by assistance

## 2024-12-13 NOTE — CONSULT NOTE ADULT - SUBJECTIVE AND OBJECTIVE BOX
Patient is a 79y old  Male who presents with a chief complaint of Hematuria    HPI: 79-year-old male with a history of bilateral kidney stones status post bilateral stent placement by urologist Dr. Car on  presenting with hematuria and dysuria starting yesterday.  Patient was seen by his PCP Dr. Doyle today in office when he was complaining of same and noted to have a low blood pressure systolic of 80s.  Patient also complaining of right lower extremity swelling.  Patient referred to the ER for evaluation.  Patient states he was discharged and taking his medications as prescribed when he started developing the need to urinate.  Blood in urine seen.  Denies fevers chills nausea vomiting.  Endorsing stable bilateral flank pain.  Taking Tylenol at home. Pt seen with Dr. Car in ED.      PAST MEDICAL & SURGICAL HISTORY:  Inguinal hernia      HTN (hypertension)      Hypercholesterolemia      Kidney stones      Hematuria      H/O right inguinal hernia repair      H/O left inguinal hernia repair      History of tonsillectomy          REVIEW OF SYSTEMS:    CONSTITUTIONAL:  fevers+  HEENT: No visual changes  ENDO: No sweating  NECK: No pain or stiffness  MUSCULOSKELETAL: No back pain, no joint pain  RESPIRATORY: No shortness of breath  CARDIOVASCULAR: No chest pain  GASTROINTESTINAL: No abdominal or epigastric pain. No nausea, vomiting,  No diarrhea or constipation.   NEUROLOGICAL: No mental status changes  PSYCH: No depression, no mood changes  SKIN: No itching      MEDICATIONS  (STANDING):  sodium chloride 0.9% Bolus 1000 milliLiter(s) IV Bolus once    MEDICATIONS  (PRN):  acetaminophen     Tablet .. 650 milliGRAM(s) Oral every 6 hours PRN Mild Pain (1 - 3)      Allergies    No Known Allergies    Intolerances        SOCIAL HISTORY: No illicit drug use    FAMILY HISTORY:      Vital Signs Last 24 Hrs  T(C): 36.6 (13 Dec 2024 16:56), Max: 36.6 (13 Dec 2024 16:56)  T(F): 97.9 (13 Dec 2024 16:56), Max: 97.9 (13 Dec 2024 16:56)  HR: 61 (13 Dec 2024 15:20) (55 - 61)  BP: 81/54 (13 Dec 2024 15:20) (81/54 - 100/60)  BP(mean): 64 (13 Dec 2024 15:20) (64 - 64)  RR: 18 (13 Dec 2024 15:20) (18 - 18)  SpO2: 98% (13 Dec 2024 15:20) (98% - 98%)    Parameters below as of 13 Dec 2024 15:20  Patient On (Oxygen Delivery Method): room air        PHYSICAL EXAM:    Constitutional: NAD, well-developed  HEENT: EOMI  Neck: no pain  Back: No CVA tenderness  Respiratory: No accessory respiratory muscle use  Abd: Soft, NT/ND  no organomegally  no hernia  : Mas cath dark brown urine with tinge of blood  Extremities: no edema  Neurological: A/O x 3  Psychiatric: Normal mood, normal affect  Skin: No rashes    I&O's Summary    13 Dec 2024 07:01  -  13 Dec 2024 18:05  --------------------------------------------------------  IN: 0 mL / OUT: 450 mL / NET: -450 mL        LABS:                        12.1   7.55  )-----------( 209      ( 13 Dec 2024 14:57 )             35.3     12-13    129[L]  |  97  |  57[H]  ----------------------------<  98  3.6   |  24  |  3.22[H]    Ca    8.4[L]      13 Dec 2024 14:57  Mg     2.1         TPro  7.5  /  Alb  3.8  /  TBili  0.9  /  DBili  x   /  AST  23  /  ALT  15  /  AlkPhos  64  12-13      Urinalysis Basic - ( 13 Dec 2024 14:57 )    Color: Orange / Appearance: Turbid / S.009 / pH: x  Gluc: 98 mg/dL / Ketone: Negative mg/dL  / Bili: Small / Urobili: 1.0 mg/dL   Blood: x / Protein: 300 mg/dL / Nitrite: Positive   Leuk Esterase: Large / RBC: 180 /HPF / WBC 66 /HPF   Sq Epi: x / Non Sq Epi: 1 /HPF / Bacteria: Negative /HPF      Urine Culture: Pending        RADIOLOGY & ADDITIONAL STUDIES: < from: CT Abdomen and Pelvis No Cont (24 @ 16:39) >  Interval placement of bilateral nephroureteral stents with positioning as   above. New mild bilateral perinephric and periureteral stranding,   slightly increased on the right, with diffuse urothelial thickening. Mild   bilateral hydronephrosis despite presence of stents and no obstructing   ureteral calculus. Findings raise suspicion for infection and clinical   correlation with urinalysis and urine cultures recommended.

## 2024-12-14 LAB
ANION GAP SERPL CALC-SCNC: 6 MMOL/L — SIGNIFICANT CHANGE UP (ref 5–17)
BASOPHILS # BLD AUTO: 0.03 K/UL — SIGNIFICANT CHANGE UP (ref 0–0.2)
BASOPHILS NFR BLD AUTO: 0.4 % — SIGNIFICANT CHANGE UP (ref 0–2)
BUN SERPL-MCNC: 43 MG/DL — HIGH (ref 7–23)
CALCIUM SERPL-MCNC: 7.9 MG/DL — LOW (ref 8.5–10.1)
CHLORIDE SERPL-SCNC: 113 MMOL/L — HIGH (ref 96–108)
CO2 SERPL-SCNC: 21 MMOL/L — LOW (ref 22–31)
CREAT SERPL-MCNC: 2.04 MG/DL — HIGH (ref 0.5–1.3)
CULTURE RESULTS: NO GROWTH — SIGNIFICANT CHANGE UP
EGFR: 33 ML/MIN/1.73M2 — LOW
EOSINOPHIL # BLD AUTO: 0.15 K/UL — SIGNIFICANT CHANGE UP (ref 0–0.5)
EOSINOPHIL NFR BLD AUTO: 2.1 % — SIGNIFICANT CHANGE UP (ref 0–6)
GLUCOSE SERPL-MCNC: 97 MG/DL — SIGNIFICANT CHANGE UP (ref 70–99)
HCT VFR BLD CALC: 33.6 % — LOW (ref 39–50)
HGB BLD-MCNC: 11.3 G/DL — LOW (ref 13–17)
IMM GRANULOCYTES NFR BLD AUTO: 0.6 % — SIGNIFICANT CHANGE UP (ref 0–0.9)
LYMPHOCYTES # BLD AUTO: 0.58 K/UL — LOW (ref 1–3.3)
LYMPHOCYTES # BLD AUTO: 8.1 % — LOW (ref 13–44)
MCHC RBC-ENTMCNC: 30.7 PG — SIGNIFICANT CHANGE UP (ref 27–34)
MCHC RBC-ENTMCNC: 33.6 G/DL — SIGNIFICANT CHANGE UP (ref 32–36)
MCV RBC AUTO: 91.3 FL — SIGNIFICANT CHANGE UP (ref 80–100)
MONOCYTES # BLD AUTO: 0.72 K/UL — SIGNIFICANT CHANGE UP (ref 0–0.9)
MONOCYTES NFR BLD AUTO: 10 % — SIGNIFICANT CHANGE UP (ref 2–14)
NEUTROPHILS # BLD AUTO: 5.65 K/UL — SIGNIFICANT CHANGE UP (ref 1.8–7.4)
NEUTROPHILS NFR BLD AUTO: 78.8 % — HIGH (ref 43–77)
OSMOLALITY UR: 207 MOSM/KG — SIGNIFICANT CHANGE UP (ref 50–1200)
PLATELET # BLD AUTO: 190 K/UL — SIGNIFICANT CHANGE UP (ref 150–400)
POTASSIUM SERPL-MCNC: 3.5 MMOL/L — SIGNIFICANT CHANGE UP (ref 3.5–5.3)
POTASSIUM SERPL-SCNC: 3.5 MMOL/L — SIGNIFICANT CHANGE UP (ref 3.5–5.3)
POTASSIUM UR-SCNC: 8.2 MMOL/L — SIGNIFICANT CHANGE UP
RBC # BLD: 3.68 M/UL — LOW (ref 4.2–5.8)
RBC # FLD: 12.1 % — SIGNIFICANT CHANGE UP (ref 10.3–14.5)
SODIUM SERPL-SCNC: 140 MMOL/L — SIGNIFICANT CHANGE UP (ref 135–145)
SODIUM UR-SCNC: 53 MMOL/L — SIGNIFICANT CHANGE UP
SPECIMEN SOURCE: SIGNIFICANT CHANGE UP
UUN UR-MCNC: 223 MG/DL — SIGNIFICANT CHANGE UP
WBC # BLD: 7.17 K/UL — SIGNIFICANT CHANGE UP (ref 3.8–10.5)
WBC # FLD AUTO: 7.17 K/UL — SIGNIFICANT CHANGE UP (ref 3.8–10.5)

## 2024-12-14 PROCEDURE — 99232 SBSQ HOSP IP/OBS MODERATE 35: CPT

## 2024-12-14 PROCEDURE — 99233 SBSQ HOSP IP/OBS HIGH 50: CPT

## 2024-12-14 RX ORDER — SODIUM CHLORIDE 9 MG/ML
1000 INJECTION, SOLUTION INTRAMUSCULAR; INTRAVENOUS; SUBCUTANEOUS
Refills: 0 | Status: DISCONTINUED | OUTPATIENT
Start: 2024-12-14 | End: 2024-12-15

## 2024-12-14 RX ADMIN — TRAMADOL HYDROCHLORIDE 50 MILLIGRAM(S): 300 CAPSULE ORAL at 21:50

## 2024-12-14 RX ADMIN — Medication 2 TABLET(S): at 21:50

## 2024-12-14 RX ADMIN — SODIUM CHLORIDE 60 MILLILITER(S): 9 INJECTION, SOLUTION INTRAMUSCULAR; INTRAVENOUS; SUBCUTANEOUS at 02:16

## 2024-12-14 RX ADMIN — ACETAMINOPHEN 500MG 650 MILLIGRAM(S): 500 TABLET, COATED ORAL at 06:45

## 2024-12-14 RX ADMIN — ACETAMINOPHEN 500MG 650 MILLIGRAM(S): 500 TABLET, COATED ORAL at 06:15

## 2024-12-14 RX ADMIN — ACETAMINOPHEN 500MG 650 MILLIGRAM(S): 500 TABLET, COATED ORAL at 13:55

## 2024-12-14 RX ADMIN — ROSUVASTATIN CALCIUM 5 MILLIGRAM(S): 5 TABLET, FILM COATED ORAL at 21:50

## 2024-12-14 RX ADMIN — TRAMADOL HYDROCHLORIDE 50 MILLIGRAM(S): 300 CAPSULE ORAL at 22:12

## 2024-12-14 RX ADMIN — ACETAMINOPHEN 500MG 650 MILLIGRAM(S): 500 TABLET, COATED ORAL at 13:01

## 2024-12-14 RX ADMIN — TRAMADOL HYDROCHLORIDE 50 MILLIGRAM(S): 300 CAPSULE ORAL at 15:40

## 2024-12-14 RX ADMIN — SODIUM CHLORIDE 80 MILLILITER(S): 9 INJECTION, SOLUTION INTRAMUSCULAR; INTRAVENOUS; SUBCUTANEOUS at 15:40

## 2024-12-14 RX ADMIN — ACETAMINOPHEN 500MG 650 MILLIGRAM(S): 500 TABLET, COATED ORAL at 21:45

## 2024-12-14 RX ADMIN — ACETAMINOPHEN 500MG 650 MILLIGRAM(S): 500 TABLET, COATED ORAL at 20:09

## 2024-12-14 RX ADMIN — Medication 1000 MILLIGRAM(S): at 15:41

## 2024-12-14 RX ADMIN — TAMSULOSIN HYDROCHLORIDE 0.4 MILLIGRAM(S): 0.4 CAPSULE ORAL at 21:50

## 2024-12-14 RX ADMIN — TRAMADOL HYDROCHLORIDE 50 MILLIGRAM(S): 300 CAPSULE ORAL at 16:12

## 2024-12-14 RX ADMIN — SODIUM CHLORIDE 80 MILLILITER(S): 9 INJECTION, SOLUTION INTRAMUSCULAR; INTRAVENOUS; SUBCUTANEOUS at 13:01

## 2024-12-14 RX ADMIN — Medication 5 MILLIGRAM(S): at 21:50

## 2024-12-15 LAB
ANION GAP SERPL CALC-SCNC: 4 MMOL/L — LOW (ref 5–17)
BASOPHILS # BLD AUTO: 0.04 K/UL — SIGNIFICANT CHANGE UP (ref 0–0.2)
BASOPHILS NFR BLD AUTO: 0.7 % — SIGNIFICANT CHANGE UP (ref 0–2)
BUN SERPL-MCNC: 30 MG/DL — HIGH (ref 7–23)
CALCIUM SERPL-MCNC: 8.4 MG/DL — LOW (ref 8.5–10.1)
CHLORIDE SERPL-SCNC: 115 MMOL/L — HIGH (ref 96–108)
CO2 SERPL-SCNC: 25 MMOL/L — SIGNIFICANT CHANGE UP (ref 22–31)
CREAT SERPL-MCNC: 1.47 MG/DL — HIGH (ref 0.5–1.3)
EGFR: 48 ML/MIN/1.73M2 — LOW
EOSINOPHIL # BLD AUTO: 0.3 K/UL — SIGNIFICANT CHANGE UP (ref 0–0.5)
EOSINOPHIL NFR BLD AUTO: 4.9 % — SIGNIFICANT CHANGE UP (ref 0–6)
GLUCOSE SERPL-MCNC: 104 MG/DL — HIGH (ref 70–99)
HCT VFR BLD CALC: 32.2 % — LOW (ref 39–50)
HGB BLD-MCNC: 10.6 G/DL — LOW (ref 13–17)
IMM GRANULOCYTES NFR BLD AUTO: 0.5 % — SIGNIFICANT CHANGE UP (ref 0–0.9)
LYMPHOCYTES # BLD AUTO: 1.05 K/UL — SIGNIFICANT CHANGE UP (ref 1–3.3)
LYMPHOCYTES # BLD AUTO: 17.2 % — SIGNIFICANT CHANGE UP (ref 13–44)
MCHC RBC-ENTMCNC: 30.5 PG — SIGNIFICANT CHANGE UP (ref 27–34)
MCHC RBC-ENTMCNC: 32.9 G/DL — SIGNIFICANT CHANGE UP (ref 32–36)
MCV RBC AUTO: 92.8 FL — SIGNIFICANT CHANGE UP (ref 80–100)
MONOCYTES # BLD AUTO: 0.68 K/UL — SIGNIFICANT CHANGE UP (ref 0–0.9)
MONOCYTES NFR BLD AUTO: 11.2 % — SIGNIFICANT CHANGE UP (ref 2–14)
NEUTROPHILS # BLD AUTO: 3.99 K/UL — SIGNIFICANT CHANGE UP (ref 1.8–7.4)
NEUTROPHILS NFR BLD AUTO: 65.5 % — SIGNIFICANT CHANGE UP (ref 43–77)
PLATELET # BLD AUTO: 193 K/UL — SIGNIFICANT CHANGE UP (ref 150–400)
POTASSIUM SERPL-MCNC: 3.8 MMOL/L — SIGNIFICANT CHANGE UP (ref 3.5–5.3)
POTASSIUM SERPL-SCNC: 3.8 MMOL/L — SIGNIFICANT CHANGE UP (ref 3.5–5.3)
RBC # BLD: 3.47 M/UL — LOW (ref 4.2–5.8)
RBC # FLD: 12.3 % — SIGNIFICANT CHANGE UP (ref 10.3–14.5)
SODIUM SERPL-SCNC: 144 MMOL/L — SIGNIFICANT CHANGE UP (ref 135–145)
WBC # BLD: 6.09 K/UL — SIGNIFICANT CHANGE UP (ref 3.8–10.5)
WBC # FLD AUTO: 6.09 K/UL — SIGNIFICANT CHANGE UP (ref 3.8–10.5)

## 2024-12-15 PROCEDURE — 99233 SBSQ HOSP IP/OBS HIGH 50: CPT

## 2024-12-15 PROCEDURE — 99232 SBSQ HOSP IP/OBS MODERATE 35: CPT

## 2024-12-15 RX ORDER — SODIUM CHLORIDE 9 MG/ML
1000 INJECTION, SOLUTION INTRAMUSCULAR; INTRAVENOUS; SUBCUTANEOUS
Refills: 0 | Status: DISCONTINUED | OUTPATIENT
Start: 2024-12-15 | End: 2024-12-16

## 2024-12-15 RX ADMIN — Medication 5 MILLIGRAM(S): at 21:09

## 2024-12-15 RX ADMIN — ACETAMINOPHEN 500MG 650 MILLIGRAM(S): 500 TABLET, COATED ORAL at 15:59

## 2024-12-15 RX ADMIN — Medication 1000 MILLIGRAM(S): at 15:59

## 2024-12-15 RX ADMIN — ACETAMINOPHEN 500MG 650 MILLIGRAM(S): 500 TABLET, COATED ORAL at 23:12

## 2024-12-15 RX ADMIN — TRAMADOL HYDROCHLORIDE 50 MILLIGRAM(S): 300 CAPSULE ORAL at 10:45

## 2024-12-15 RX ADMIN — ACETAMINOPHEN 500MG 650 MILLIGRAM(S): 500 TABLET, COATED ORAL at 22:48

## 2024-12-15 RX ADMIN — SODIUM CHLORIDE 80 MILLILITER(S): 9 INJECTION, SOLUTION INTRAMUSCULAR; INTRAVENOUS; SUBCUTANEOUS at 04:57

## 2024-12-15 RX ADMIN — TRAMADOL HYDROCHLORIDE 50 MILLIGRAM(S): 300 CAPSULE ORAL at 09:50

## 2024-12-15 RX ADMIN — Medication 5 MILLIGRAM(S): at 09:44

## 2024-12-15 RX ADMIN — Medication 2 TABLET(S): at 21:08

## 2024-12-15 RX ADMIN — SODIUM CHLORIDE 75 MILLILITER(S): 9 INJECTION, SOLUTION INTRAMUSCULAR; INTRAVENOUS; SUBCUTANEOUS at 13:28

## 2024-12-15 RX ADMIN — TRAMADOL HYDROCHLORIDE 50 MILLIGRAM(S): 300 CAPSULE ORAL at 03:37

## 2024-12-15 RX ADMIN — ROSUVASTATIN CALCIUM 5 MILLIGRAM(S): 5 TABLET, FILM COATED ORAL at 21:08

## 2024-12-15 RX ADMIN — ACETAMINOPHEN 500MG 650 MILLIGRAM(S): 500 TABLET, COATED ORAL at 16:50

## 2024-12-15 RX ADMIN — TRAMADOL HYDROCHLORIDE 50 MILLIGRAM(S): 300 CAPSULE ORAL at 03:55

## 2024-12-15 RX ADMIN — TAMSULOSIN HYDROCHLORIDE 0.4 MILLIGRAM(S): 0.4 CAPSULE ORAL at 21:09

## 2024-12-16 ENCOUNTER — TRANSCRIPTION ENCOUNTER (OUTPATIENT)
Age: 79
End: 2024-12-16

## 2024-12-16 ENCOUNTER — NON-APPOINTMENT (OUTPATIENT)
Age: 79
End: 2024-12-16

## 2024-12-16 VITALS
TEMPERATURE: 98 F | SYSTOLIC BLOOD PRESSURE: 138 MMHG | RESPIRATION RATE: 18 BRPM | HEART RATE: 68 BPM | DIASTOLIC BLOOD PRESSURE: 77 MMHG | OXYGEN SATURATION: 97 %

## 2024-12-16 DIAGNOSIS — E78.5 HYPERLIPIDEMIA, UNSPECIFIED: ICD-10-CM

## 2024-12-16 DIAGNOSIS — N20.0 CALCULUS OF KIDNEY: ICD-10-CM

## 2024-12-16 DIAGNOSIS — I10 ESSENTIAL (PRIMARY) HYPERTENSION: ICD-10-CM

## 2024-12-16 LAB
ANION GAP SERPL CALC-SCNC: 3 MMOL/L — LOW (ref 5–17)
BASOPHILS # BLD AUTO: 0.03 K/UL — SIGNIFICANT CHANGE UP (ref 0–0.2)
BASOPHILS # BLD AUTO: 0.04 K/UL
BASOPHILS NFR BLD AUTO: 0.5 %
BASOPHILS NFR BLD AUTO: 0.5 % — SIGNIFICANT CHANGE UP (ref 0–2)
BUN SERPL-MCNC: 21 MG/DL — SIGNIFICANT CHANGE UP (ref 7–23)
CALCIUM SERPL-MCNC: 8.5 MG/DL — SIGNIFICANT CHANGE UP (ref 8.5–10.1)
CHLORIDE SERPL-SCNC: 113 MMOL/L — HIGH (ref 96–108)
CO2 SERPL-SCNC: 26 MMOL/L — SIGNIFICANT CHANGE UP (ref 22–31)
CREAT SERPL-MCNC: 1.26 MG/DL — SIGNIFICANT CHANGE UP (ref 0.5–1.3)
EGFR: 58 ML/MIN/1.73M2 — LOW
EOSINOPHIL # BLD AUTO: 0.22 K/UL
EOSINOPHIL # BLD AUTO: 0.32 K/UL — SIGNIFICANT CHANGE UP (ref 0–0.5)
EOSINOPHIL NFR BLD AUTO: 2.9 %
EOSINOPHIL NFR BLD AUTO: 5.5 % — SIGNIFICANT CHANGE UP (ref 0–6)
GLUCOSE SERPL-MCNC: 103 MG/DL — HIGH (ref 70–99)
HCT VFR BLD CALC: 31.9 % — LOW (ref 39–50)
HCT VFR BLD CALC: 33.8 %
HGB BLD-MCNC: 10.6 G/DL — LOW (ref 13–17)
HGB BLD-MCNC: 11.3 G/DL
IMM GRANULOCYTES NFR BLD AUTO: 0.4 %
IMM GRANULOCYTES NFR BLD AUTO: 0.5 % — SIGNIFICANT CHANGE UP (ref 0–0.9)
LYMPHOCYTES # BLD AUTO: 0.81 K/UL
LYMPHOCYTES # BLD AUTO: 1.22 K/UL — SIGNIFICANT CHANGE UP (ref 1–3.3)
LYMPHOCYTES # BLD AUTO: 21.1 % — SIGNIFICANT CHANGE UP (ref 13–44)
LYMPHOCYTES NFR BLD AUTO: 10.6 %
MAN DIFF?: NORMAL
MCHC RBC-ENTMCNC: 30.8 PG — SIGNIFICANT CHANGE UP (ref 27–34)
MCHC RBC-ENTMCNC: 31.1 PG
MCHC RBC-ENTMCNC: 33.2 G/DL — SIGNIFICANT CHANGE UP (ref 32–36)
MCHC RBC-ENTMCNC: 33.4 G/DL
MCV RBC AUTO: 92.7 FL — SIGNIFICANT CHANGE UP (ref 80–100)
MCV RBC AUTO: 93.1 FL
MONOCYTES # BLD AUTO: 0.65 K/UL — SIGNIFICANT CHANGE UP (ref 0–0.9)
MONOCYTES # BLD AUTO: 1.1 K/UL
MONOCYTES NFR BLD AUTO: 11.3 % — SIGNIFICANT CHANGE UP (ref 2–14)
MONOCYTES NFR BLD AUTO: 14.3 %
NEUTROPHILS # BLD AUTO: 3.52 K/UL — SIGNIFICANT CHANGE UP (ref 1.8–7.4)
NEUTROPHILS # BLD AUTO: 5.47 K/UL
NEUTROPHILS NFR BLD AUTO: 61.1 % — SIGNIFICANT CHANGE UP (ref 43–77)
NEUTROPHILS NFR BLD AUTO: 71.3 %
PLATELET # BLD AUTO: 180 K/UL — SIGNIFICANT CHANGE UP (ref 150–400)
PLATELET # BLD AUTO: 204 K/UL
POTASSIUM SERPL-MCNC: 3.6 MMOL/L — SIGNIFICANT CHANGE UP (ref 3.5–5.3)
POTASSIUM SERPL-SCNC: 3.6 MMOL/L — SIGNIFICANT CHANGE UP (ref 3.5–5.3)
RBC # BLD: 3.44 M/UL — LOW (ref 4.2–5.8)
RBC # BLD: 3.63 M/UL
RBC # FLD: 11.9 % — SIGNIFICANT CHANGE UP (ref 10.3–14.5)
RBC # FLD: 12.3 %
SODIUM SERPL-SCNC: 142 MMOL/L — SIGNIFICANT CHANGE UP (ref 135–145)
WBC # BLD: 5.77 K/UL — SIGNIFICANT CHANGE UP (ref 3.8–10.5)
WBC # FLD AUTO: 5.77 K/UL — SIGNIFICANT CHANGE UP (ref 3.8–10.5)
WBC # FLD AUTO: 7.67 K/UL

## 2024-12-16 PROCEDURE — 99232 SBSQ HOSP IP/OBS MODERATE 35: CPT

## 2024-12-16 RX ADMIN — SODIUM CHLORIDE 75 MILLILITER(S): 9 INJECTION, SOLUTION INTRAMUSCULAR; INTRAVENOUS; SUBCUTANEOUS at 05:38

## 2024-12-16 RX ADMIN — Medication 5 MILLIGRAM(S): at 10:33

## 2024-12-16 RX ADMIN — ACETAMINOPHEN 500MG 650 MILLIGRAM(S): 500 TABLET, COATED ORAL at 11:15

## 2024-12-16 RX ADMIN — ACETAMINOPHEN 500MG 650 MILLIGRAM(S): 500 TABLET, COATED ORAL at 10:33

## 2024-12-16 NOTE — DISCHARGE NOTE PROVIDER - NSDCMRMEDTOKEN_GEN_ALL_CORE_FT
atenolol 25 mg oral tablet: 1 tab(s) orally once a day  cefuroxime 500 mg oral tablet: 1 tab(s) orally 2 times a day  hydroCHLOROthiazide 12.5 mg oral capsule: 1 cap(s) orally once a day  oxyBUTYnin 5 mg oral tablet: 1 tab(s) orally 3 times a day as needed for  urinary urgency  phenazopyridine 200 mg oral tablet: 1 tab(s) orally 3 times a day as needed for  burning with urination  ramipril 5 mg oral capsule: 1 cap(s) orally once a day  rosuvastatin 5 mg oral tablet: 1 tab(s) orally once a day (at bedtime)  tamsulosin 0.4 mg oral capsule: 1 cap(s) orally once a day (at bedtime)  traMADol 50 mg oral tablet: 1 tab(s) orally 4 times a day as needed for  severe pain MDD: 4 tabs

## 2024-12-16 NOTE — PROGRESS NOTE ADULT - ASSESSMENT
78 Y/O M h/o B/L history of bilateral kidney stones status post bilateral stent placement 12/11. A/w UTI, Terrell and urinary retention.    Bilateral stents s/p bilateral URS on 12/11   - stents in good position on CT  - can give medications as needed for stent colic: Oxybutynin 5 mg Q8hrs PRN bladder spasms, Pyridium 200 mg Q8hrs PRN dysuria    Acute kidney injury - improving  - creatinine continues to downtrend: 1.47 this AM, continue to trend  - TERRELL likely due to acute urinary retention    Complicated UTI  - continue ceftriaxone  - followup urine cultures    Urinary retention, Gross hematuria likely in setting of acute UTI - improving hematuria  - maintain payne catheter  - continue tamsulosin 0.4mg QHS  - will check with Dr. Car tomorrow regarding payne plan and stent removal plan, may delay stent removal given recent infection and retention
A/P:    #Acute UTI  # bilateral kidney stones s/p bilateral stent placement 12/ 11  # Hydronephrosis   - mild flank pain   - UA + turbid protein 300 large blood small bili large LE + nitrite wbc 66    - hypotensive in ER   - CT A/P: Interval placement of bilateral nephroureteral stents with positioning as above. New mild bilateral perinephric and periureteral stranding, slightly increased on the right, with diffuse urothelial thickening. Mild bilateral hydronephrosis despite presence of stents and no obstructing ureteral calculus  - s/p 1x Rocephin x1 iv OFIRMEV x1, morphine 2mg x1 3200 NS in ER  - Mas was placed in ER by urology.  DARK  blood in the bag, urine start to clear after IV bolus NS  - Maintenance fluid   - will continue Rocephin for now until culture comes back   - f/u urine and blood culture   -follow urology consult     # [place]   - BUN/Cr: 57/ 3.2  last cr 1.75   - NS at rate of 80 cc/hr x 24 hours   - Avoid nephrotoxic agents.  - F/u BMP in AM    # Anemia   - H/H: 12.1/35.3  - baseline 13  - STAT hb   - Monitor for signs/symptoms of bleeding  - Monitor daily CBC.    # LE edema  -  doppler LE U/S: No evidence of deep venous thrombosis in either lower extremity.  - stable     # HTN  - at home Ramipril , HCTZ and atenolol   - bp labile resume when able     # HLD   Continue statin     # Constipation   - at home senna , Dulcolax     # DVT pro   - SCD's for now   
80 Y/O M h/o B/L history of bilateral kidney stones status post bilateral stent placement 12/11. A/w UTI, ORLANDO and urinary retention.  Bilateral stents s/p bilateral URS on 12/11. stents in good position on CT  Acute kidney injury - resolved, creat trended down after payne placement.  Recommend  - continue ceftriaxone and followup urine cultures treat as per c/s  - continue tamsulosin 0.4mg QHS  - D/C [place] with payne to leg bag and outpatient follow up for cystoscopy b/l ureteral stent removal tomorrow as scheduled with Dr. Car at 1 pm  Case discussed with Dr. Car
A/P:    #Acute UTI  # bilateral kidney stones s/p bilateral stent placement 12/ 11  # Hydronephrosis   - mild flank pain   - UA + turbid protein 300 large blood small bili large LE + nitrite wbc 66    - hypotensive in ER   - CT A/P: Interval placement of bilateral nephroureteral stents with positioning as above. New mild bilateral perinephric and periureteral stranding, slightly increased on the right, with diffuse urothelial thickening. Mild bilateral hydronephrosis despite presence of stents and no obstructing ureteral calculus  - s/p 1x Rocephin x1 iv OFIRMEV x1, morphine 2mg x1 3200 NS in ER  - Mas was placed in ER by urology.  DARK  blood in the bag, urine start to clear after IV bolus NS  - Maintenance fluid   - will continue Rocephin for now until culture comes back   - f/u urine and blood culture   - follow urology consult     # [place]   - BUN/Cr: 57/ 3.2  last cr 1.75   - NS at rate of 75 cc/hr x 24 hours   - Cr improving   - Avoid nephrotoxic agents.  - F/u BMP in AM    # Anemia   - H/H: 12.1/35.3  - baseline 13  - Monitor for signs/symptoms of bleeding  - Monitor daily CBC.    # LE edema  -  doppler LE U/S: No evidence of deep venous thrombosis in either lower extremity.  - stable     # HTN  - at home Ramipril , HCTZ and atenolol   - bp labile resume when able     # HLD   Continue statin     # Constipation   - at home senna , Dulcolax     # DVT pro   - SCD's for now   
78 Y/O M h/o B/L history of bilateral kidney stones status post bilateral stent placement 12/11. A/w UTI, Terrell and urinary retention.    Bilateral stents s/p bilateral URS on 12/11   - stents in good position on CT  - can give medications as needed for stent colic:  Oxybutynin 5 mg Q8hrs PRN bladder spasms, Pyridium 200 mg Q8hrs PRN dysuria    Acute kidney injury - improving  - creatinine downtrending to 2.04 this AM, continue to trend    Complicated UTI  - continue ceftriaxone  - followup urine cultures    Urinary retention, Gross hematuria likely in setting of acute UTI - improving hematuria  - maintain payne catheter, monitor for clots  - continue tamsulosin 0.4mg QHS  - will consider TOV prior to discharge vs discharge with payne for TOV as outpatient     urology will follow

## 2024-12-16 NOTE — PROGRESS NOTE ADULT - SUBJECTIVE AND OBJECTIVE BOX
SUBJECTIVE:  Patient seen and examined at bedside. No acute complaints. Urine is clearing up. Denies significant discomfort with the stents - occasional pain in back when walking. No pain while lying in bed.   Afebrile and HDS.     PAST MEDICAL & SURGICAL HISTORY:  Inguinal hernia      HTN (hypertension)      Hypercholesterolemia      Kidney stones      Hematuria      H/O right inguinal hernia repair      H/O left inguinal hernia repair      History of tonsillectomy          REVIEW OF SYSTEMS:Same previous    MEDICATIONS  (STANDING):  bisacodyl 5 milliGRAM(s) Oral at bedtime  cefTRIAXone Injectable. 1000 milliGRAM(s) IV Push every 24 hours  rosuvastatin 5 milliGRAM(s) Oral at bedtime  senna 2 Tablet(s) Oral at bedtime  sodium chloride 0.9%. 1000 milliLiter(s) (80 mL/Hr) IV Continuous <Continuous>  tamsulosin 0.4 milliGRAM(s) Oral at bedtime    MEDICATIONS  (PRN):  acetaminophen     Tablet .. 650 milliGRAM(s) Oral every 6 hours PRN Mild Pain (1 - 3)  oxybutynin 5 milliGRAM(s) Oral three times a day PRN for urinary urgency  phenazopyridine 200 milliGRAM(s) Oral three times a day PRN for burning with urination  traMADol 50 milliGRAM(s) Oral every 6 hours PRN Severe Pain (7 - 10)      Vital Signs Last 24 Hrs  T(C): 36.7 (14 Dec 2024 15:36), Max: 36.7 (14 Dec 2024 08:10)  T(F): 98.1 (14 Dec 2024 15:36), Max: 98.1 (14 Dec 2024 08:10)  HR: 70 (14 Dec 2024 15:36) (70 - 73)  BP: 105/66 (14 Dec 2024 15:36) (105/66 - 117/75)  BP(mean): 89 (13 Dec 2024 19:14) (89 - 89)  RR: 16 (14 Dec 2024 15:36) (16 - 18)  SpO2: 94% (14 Dec 2024 15:36) (94% - 98%)    Parameters below as of 14 Dec 2024 15:36  Patient On (Oxygen Delivery Method): room air        General: No distress, No anxiety          Skin     : No jaundice, No lesions, No swelling  HEENT: Normocephalic, no icterus , EOM full , No epistaxis  Lung    : No resp distress  Abdo:   : Soft, Non tender, No guarding, No distension   Back    : No CVAT b/l  Extremity: No calf tenderness   Genitalia Male: payne with light red urine, improving, no clots seen  Neuro   : A&Ox3    LABS:                        11.3   7.17  )-----------( 190      ( 14 Dec 2024 09:35 )             33.6     12-14    140  |  113[H]  |  43[H]  ----------------------------<  97  3.5   |  21[L]  |  2.04[H]    Ca    7.9[L]      14 Dec 2024 09:35  Mg     2.1     12-13    TPro  7.5  /  Alb  3.8  /  TBili  0.9  /  DBili  x   /  AST  23  /  ALT  15  /  AlkPhos  64  12-13      Urinalysis Basic - ( 14 Dec 2024 09:35 )    Color: x / Appearance: x / SG: x / pH: x  Gluc: 97 mg/dL / Ketone: x  / Bili: x / Urobili: x   Blood: x / Protein: x / Nitrite: x   Leuk Esterase: x / RBC: x / WBC x   Sq Epi: x / Non Sq Epi: x / Bacteria: x      Urine Culture: pending    RADIOLOGY & ADDITIONAL STUDIES:  < from: CT Abdomen and Pelvis No Cont (12.13.24 @ 16:39) >  PROCEDURE DATE:  12/13/2024          INTERPRETATION:  CLINICAL INFORMATION: Bilateral flank pain. Recent stent   placement 2 days ago    COMPARISON: CT abdomen pelvis 11/12/2024. Fluoroscopic images of the   abdomen during bilateral nephroureteral stent placement.    CONTRAST/COMPLICATIONS:  IV Contrast: NONE  Oral Contrast: NONE  .    PROCEDURE:  CT of the Abdomen and Pelvis was performed.  Sagittal and coronal reformats were performed.    FINDINGS:  LOWER CHEST: Minimal subsegmental atelectasis in the lingula of the left   upper and left lower lobes. 3 mm right lower lobe pulmonary nodule   questioned on series 2 image 11, not clearly identified on the prior   exam. Tiny hiatal hernia.    LIVER: Within normal limits.  BILE DUCTS: Normal caliber.  GALLBLADDER: Partially contracted.  SPLEEN: Within normal limits.  PANCREAS: Within normal limits.  ADRENALS: Within normal limits.  KIDNEYS/URETERS: Bilateral nephroureteral stents are now present with   previously seen 1.4 cm stone in the right renal pelvis no longer   visualized. There is persistent if not slightly increased mild right   hydroureteronephrosis and new mild left hydroureteronephrosis. Mildly   increased right periureteral stranding with new bilateral perinephric and   left periureteral stranding and urothelial thickening in both collecting   systems. No ureteral stones are identified allowing for the presence of   the stents. Pigtail portionsof the proximal stents are in the bilateral   upper calyces and distal portions in the bladder. Previously seen stone   in the upper pole of the left kidney is similar in size measuring 0.4 cm.   New nonobstructing stone in the lower pole the right kidney measuring 0.3   cm. Stable small right renal cysts, the smaller of which is slightly   higher than simple fluid in attenuation at 25HU.    BLADDER: Distended without significant wall thickening. No intrabladder   calculus.  REPRODUCTIVE ORGANS: Prostate within normal limits size.    BOWEL: No bowel obstruction. Appendix is normal. Scattered colonic   diverticula. No focal wall thickening or pericolonic inflammatory changes.  PERITONEUM/RETROPERITONEUM: Within normal limits.  VESSELS: Within normal limits.  LYMPH NODES: No lymphadenopathy.  ABDOMINAL WALL: Left inguinal hernia repair, unchanged.  BONES: Bilateral pars interarticularis defects at L4 with grade 2   anterior spondylolisthesis of L4 on L5, unchanged. Degenerative changes.    IMPRESSION:  Interval placement of bilateral nephroureteral stents with positioning as   above. New mild bilateral perinephric and periureteral stranding,   slightly increased on the right, with diffuse urothelial thickening. Mild   bilateral hydronephrosis despite presence of stents and no obstructing   ureteral calculus. Findings raise suspicion for infection and clinical   correlation with urinalysis and urine cultures recommended.    < end of copied text >  
SUBJECTIVE:  Patient seen and examined at bedside. Feeling well. No pain from stents currently. Minimal discomfort with payne catheter.     PAST MEDICAL & SURGICAL HISTORY:  Inguinal hernia      HTN (hypertension)      Hypercholesterolemia      Kidney stones      Hematuria      H/O right inguinal hernia repair      H/O left inguinal hernia repair      History of tonsillectomy          REVIEW OF SYSTEMS:Same previous    MEDICATIONS  (STANDING):  bisacodyl 5 milliGRAM(s) Oral at bedtime  cefTRIAXone Injectable. 1000 milliGRAM(s) IV Push every 24 hours  rosuvastatin 5 milliGRAM(s) Oral at bedtime  senna 2 Tablet(s) Oral at bedtime  sodium chloride 0.9%. 1000 milliLiter(s) (75 mL/Hr) IV Continuous <Continuous>  tamsulosin 0.4 milliGRAM(s) Oral at bedtime    MEDICATIONS  (PRN):  acetaminophen     Tablet .. 650 milliGRAM(s) Oral every 6 hours PRN Mild Pain (1 - 3)  bisacodyl 5 milliGRAM(s) Oral every 12 hours PRN Constipation  oxybutynin 5 milliGRAM(s) Oral three times a day PRN for urinary urgency  phenazopyridine 200 milliGRAM(s) Oral three times a day PRN for burning with urination  traMADol 50 milliGRAM(s) Oral every 6 hours PRN Severe Pain (7 - 10)      Vital Signs Last 24 Hrs  T(C): 36.8 (15 Dec 2024 08:17), Max: 36.8 (15 Dec 2024 08:17)  T(F): 98.2 (15 Dec 2024 08:17), Max: 98.2 (15 Dec 2024 08:17)  HR: 72 (15 Dec 2024 08:17) (70 - 79)  BP: 129/75 (15 Dec 2024 08:17) (105/66 - 129/75)  BP(mean): --  RR: 18 (15 Dec 2024 08:17) (16 - 18)  SpO2: 97% (15 Dec 2024 08:17) (94% - 97%)    Parameters below as of 15 Dec 2024 08:17  Patient On (Oxygen Delivery Method): room air        General: No distress, No anxiety          Skin     : No jaundice, No lesions, No swelling  HEENT: Normocephalic, no icterus , EOM full , No epistaxis  Lung    : No resp distress  Abdo:   : Soft, Non tender, No guarding, No distension   Back    : No CVAT b/l  Extremity: No calf tenderness   Genitalia Male: payne with yellow urine, small sediment in tubing  Neuro   : A&Ox3    LABS:                        10.6   6.09  )-----------( 193      ( 15 Dec 2024 06:58 )             32.2     12-15    144  |  115[H]  |  30[H]  ----------------------------<  104[H]  3.8   |  25  |  1.47[H]    Ca    8.4[L]      15 Dec 2024 06:58  Mg     2.1     12-13    TPro  7.5  /  Alb  3.8  /  TBili  0.9  /  DBili  x   /  AST  23  /  ALT  15  /  AlkPhos  64  12-13      Urinalysis Basic - ( 15 Dec 2024 06:58 )    Color: x / Appearance: x / SG: x / pH: x  Gluc: 104 mg/dL / Ketone: x  / Bili: x / Urobili: x   Blood: x / Protein: x / Nitrite: x   Leuk Esterase: x / RBC: x / WBC x   Sq Epi: x / Non Sq Epi: x / Bacteria: x      Urine Culture: pending
Patient seen at bedside. Pt reports he feels well. Denies any abd/flank pain. Has intermittent pink tinge urine. Mas draining well.     PE  VITALS  T(C): 36.6 (12-16-24 @ 07:40), Max: 36.6 (12-15-24 @ 21:08)  HR: 68 (12-16-24 @ 07:40) (68 - 84)  BP: 138/77 (12-16-24 @ 07:40) (132/92 - 138/77)  RR: 18 (12-16-24 @ 07:40) (18 - 18)  SpO2: 97% (12-16-24 @ 07:40) (95% - 97%)               Lung    : No resp distress  Abdo:   : Soft, Non tender, No guarding, No distension   Back    : No CVAT b/l  Genitalia Male:yellow to very light pink tinge urine      LABS                        10.6   5.77  )-----------( 180      ( 16 Dec 2024 06:46 )             31.9   12-16    142  |  113[H]  |  21  ----------------------------<  103[H]  3.6   |  26  |  1.26    Ca    8.5      16 Dec 2024 06:46      
Patient is seen and examined. Chart is reviewed. Hematuria is clearing up. No abdominal pain.    Gen: No fever, chills, weakness  ENT: No visual changes or throat pain  Neck: No pain or stiffness  Respiratory: No cough or wheezing  Cardiovascular: No chest pain or palpitations  Gastrointestinal: No abdominal pain, nausea, vomiting, constipation, or diarrhea  Hematologic: No easy bleeding or bruising  Neurologic: No numbness or focal weakness  Psych: No depression or insomnia  Skin: No rash or itching      MEDICATIONS  (STANDING):  bisacodyl 5 milliGRAM(s) Oral at bedtime  cefTRIAXone Injectable. 1000 milliGRAM(s) IV Push every 24 hours  rosuvastatin 5 milliGRAM(s) Oral at bedtime  senna 2 Tablet(s) Oral at bedtime  sodium chloride 0.9%. 1000 milliLiter(s) (75 mL/Hr) IV Continuous <Continuous>  tamsulosin 0.4 milliGRAM(s) Oral at bedtime    MEDICATIONS  (PRN):  acetaminophen     Tablet .. 650 milliGRAM(s) Oral every 6 hours PRN Mild Pain (1 - 3)  bisacodyl 5 milliGRAM(s) Oral every 12 hours PRN Constipation  oxybutynin 5 milliGRAM(s) Oral three times a day PRN for urinary urgency  phenazopyridine 200 milliGRAM(s) Oral three times a day PRN for burning with urination  traMADol 50 milliGRAM(s) Oral every 6 hours PRN Severe Pain (7 - 10)      Vital Signs Last 24 Hrs  T(C): 36.8 (15 Dec 2024 08:17), Max: 36.8 (15 Dec 2024 08:17)  T(F): 98.2 (15 Dec 2024 08:17), Max: 98.2 (15 Dec 2024 08:17)  HR: 72 (15 Dec 2024 08:17) (70 - 79)  BP: 129/75 (15 Dec 2024 08:17) (105/66 - 129/75)  BP(mean): --  RR: 18 (15 Dec 2024 08:17) (16 - 18)  SpO2: 97% (15 Dec 2024 08:17) (94% - 97%)    Parameters below as of 15 Dec 2024 08:17  Patient On (Oxygen Delivery Method): room air        GEN: NAD, comfortable, resting in bed  HEENT: NC/AT, EOMI, PERRLA, MMM, clear conjunctiva and sclera, normal hearing, no nasal discharge, throat clear, no thrush, normal dentition.   NECK: supple, no JVD, no LAD, no thyromegaly  BACK:  ROM intact, no spinal/paraspinal tenderness  CV: S1S2, RRR, no mumur  RESP: good air movement, CTABL, no rales, rhonchi or wheezing, respirations unlabored  ABD: +BS, soft, ND, NT, no guarding, no rigidity, no HSM, payne cath in place   EXT: +2 radial and pedal pulses, no edema, no calve tenderness  SKIN: No visible Rashes or Ulcers  MSK: ROM intact in all extremities  NEURO:  sensory and CN 2-12 Grossly intact, no motor deficits, no, saddle anesthesia, AOx3  PSYCH: normal behavior         LABS:                          10.6   6.09  )-----------( 193      ( 15 Dec 2024 06:58 )             32.2     15 Dec 2024 06:58    144    |  115    |  30     ----------------------------<  104    3.8     |  25     |  1.47     Ca    8.4        15 Dec 2024 06:58  Mg     2.1       13 Dec 2024 14:57    TPro  7.5    /  Alb  3.8    /  TBili  0.9    /  DBili  x      /  AST  23     /  ALT  15     /  AlkPhos  64     13 Dec 2024 14:57    LIVER FUNCTIONS - ( 13 Dec 2024 14:57 )  Alb: 3.8 g/dL / Pro: 7.5 gm/dL / ALK PHOS: 64 U/L / ALT: 15 U/L / AST: 23 U/L / GGT: x             CAPILLARY BLOOD GLUCOSE            Urinalysis Basic - ( 15 Dec 2024 06:58 )    Color: x / Appearance: x / SG: x / pH: x  Gluc: 104 mg/dL / Ketone: x  / Bili: x / Urobili: x   Blood: x / Protein: x / Nitrite: x   Leuk Esterase: x / RBC: x / WBC x   Sq Epi: x / Non Sq Epi: x / Bacteria: x        RADIOLOGY:        
Patient is seen and examined. Chart is reviewed. Hematuria is improving. Mas cath in place.     Gen: No fever, chills, weakness  ENT: No visual changes or throat pain  Neck: No pain or stiffness  Respiratory: No cough or wheezing  Cardiovascular: No chest pain or palpitations  Gastrointestinal: No abdominal pain, nausea, vomiting, constipation, or diarrhea  Hematologic: No easy bleeding or bruising  Neurologic: No numbness or focal weakness  Psych: No depression or insomnia  Skin: No rash or itching      MEDICATIONS  (STANDING):  bisacodyl 5 milliGRAM(s) Oral at bedtime  cefTRIAXone Injectable. 1000 milliGRAM(s) IV Push every 24 hours  rosuvastatin 5 milliGRAM(s) Oral at bedtime  senna 2 Tablet(s) Oral at bedtime  sodium chloride 0.9%. 1000 milliLiter(s) (80 mL/Hr) IV Continuous <Continuous>  tamsulosin 0.4 milliGRAM(s) Oral at bedtime    MEDICATIONS  (PRN):  acetaminophen     Tablet .. 650 milliGRAM(s) Oral every 6 hours PRN Mild Pain (1 - 3)  oxybutynin 5 milliGRAM(s) Oral three times a day PRN for urinary urgency  phenazopyridine 200 milliGRAM(s) Oral three times a day PRN for burning with urination  traMADol 50 milliGRAM(s) Oral every 6 hours PRN Severe Pain (7 - 10)      Vital Signs Last 24 Hrs  T(C): 36.7 (14 Dec 2024 08:10), Max: 36.7 (14 Dec 2024 08:10)  T(F): 98.1 (14 Dec 2024 08:10), Max: 98.1 (14 Dec 2024 08:10)  HR: 72 (14 Dec 2024 08:10) (55 - 73)  BP: 117/72 (14 Dec 2024 08:10) (81/54 - 117/75)  BP(mean): 89 (13 Dec 2024 19:14) (64 - 89)  RR: 18 (14 Dec 2024 08:10) (18 - 18)  SpO2: 97% (14 Dec 2024 08:10) (97% - 98%)    Parameters below as of 14 Dec 2024 08:10  Patient On (Oxygen Delivery Method): room air        GEN: NAD, comfortable, resting in bed  HEENT: NC/AT, EOMI, PERRLA, MMM, clear conjunctiva and sclera, normal hearing, no nasal discharge, throat clear, no thrush, normal dentition.   NECK: supple, no JVD, no LAD, no thyromegaly  BACK:  ROM intact, no spinal/paraspinal tenderness  CV: S1S2, RRR, no mumur  RESP: good air movement, CTABL, no rales, rhonchi or wheezing, respirations unlabored  ABD: +BS, soft, ND, NT, no guarding, no rigidity, no HSM, Mas cath in place   EXT: +2 radial and pedal pulses, no edema, no calve tenderness  SKIN: No visible Rashes or Ulcers  MSK: ROM intact in all extremities  NEURO:  sensory and CN 2-12 Grossly intact, no motor deficits, no, saddle anesthesia, AOx3  PSYCH: normal behavior         LABS:                          11.3   7.17  )-----------( 190      ( 14 Dec 2024 09:35 )             33.6     14 Dec 2024 09:35    140    |  113    |  43     ----------------------------<  97     3.5     |  21     |  2.04     Ca    7.9        14 Dec 2024 09:35  Mg     2.1       13 Dec 2024 14:57    TPro  7.5    /  Alb  3.8    /  TBili  0.9    /  DBili  x      /  AST  23     /  ALT  15     /  AlkPhos  64     13 Dec 2024 14:57    LIVER FUNCTIONS - ( 13 Dec 2024 14:57 )  Alb: 3.8 g/dL / Pro: 7.5 gm/dL / ALK PHOS: 64 U/L / ALT: 15 U/L / AST: 23 U/L / GGT: x             CAPILLARY BLOOD GLUCOSE            Urinalysis Basic - ( 14 Dec 2024 09:35 )    Color: x / Appearance: x / SG: x / pH: x  Gluc: 97 mg/dL / Ketone: x  / Bili: x / Urobili: x   Blood: x / Protein: x / Nitrite: x   Leuk Esterase: x / RBC: x / WBC x   Sq Epi: x / Non Sq Epi: x / Bacteria: x        RADIOLOGY:

## 2024-12-16 NOTE — DISCHARGE NOTE NURSING/CASE MANAGEMENT/SOCIAL WORK - PATIENT PORTAL LINK FT
You can access the FollowMyHealth Patient Portal offered by [facility] by registering at the following website: [email or web address]. By joining [employer]’s FollowMyHealth portal, you will also be able to view your health information using other applications (apps) compatible with our system.

## 2024-12-16 NOTE — DISCHARGE NOTE PROVIDER - CARE PROVIDERS DIRECT ADDRESSES
[email or web address]

## 2024-12-16 NOTE — DISCHARGE NOTE NURSING/CASE MANAGEMENT/SOCIAL WORK - NSDCVIVACCINE_GEN_ALL_CORE_FT
Tdap; 14-Jul-2022 12:12; Rose Carrillo (RN); [employer]; [id number] (Exp. Date: 11-Mar-2024); IntraMuscular; Deltoid Left.; 0.5 milliLiter(s); VIS (VIS Published: 09-May-2013, VIS Presented: 14-Jul-2022);

## 2024-12-16 NOTE — DISCHARGE NOTE PROVIDER - CARE PROVIDER_API CALL
Kaushik Car  Urology  [address] [place] [address], [address]  [place] [address]  Phone: [phone]  Fax: [phone]  Established Patient  Scheduled Appointment: 12/17/2024

## 2024-12-16 NOTE — DISCHARGE NOTE PROVIDER - HOSPITAL COURSE
79-yo F w/ PMHx of  bilateral kidney stones s/p bilateral stent placement by urologist Dr. Car on 12/ 11 presenting with hematuria and dysuria starting yesterday.  Patient was seen by his PCP Dr. Doyle today in office when he was complaining of same and noted to have a low blood pressure systolic of 80s.  Patient also complaining of right lower extremity swelling.  Patient referred to the ER for evaluation.  Patient states he was discharged and taking his medications as prescribed when he started developing the need to urinate.  + Blood in urine   Endorsing stable bilateral flank pain. Denies fevers chills nausea vomiting.    #Acute UTI  # bilateral kidney stones s/p bilateral stent placement 12/ 11  # Hydronephrosis   - mild flank pain - improved   - UA + turbid protein 300 large blood small bili large LE + nitrite wbc 66    - hypotensive in ER, hypotensive resolved   - CT A/P: Interval placement of bilateral nephroureteral stents with positioning as above. New mild bilateral perinephric and periureteral stranding, slightly increased on the right, with diffuse urothelial thickening. Mild bilateral hydronephrosis despite presence of stents and no obstructing ureteral calculus  - s/p 1x Rocephin x1 iv OFIRMEV x1, morphine 2mg x1 3200 NS in ER  - Payne was placed in ER by urology  - hematuria improved  -urology consult appreciated: - D/C home with payne to leg bag and outpatient follow up for cystoscopy b/l ureteral stent removal tomorrow as scheduled with Dr. Car at 1 pm  Case discussed with Dr. Car  -will dc home with PO course of antibiotics       # ORLANDO   - s/p IV NS   - Cr improved  - Cr 1.26 on 12/16/24      # Anemia   - stable H & H  10.6/31.9 on 12/16/24  - Monitor CBC in clinic     # LE edema  -  doppler LE U/S: No evidence of deep venous thrombosis in either lower extremity.  - stable     # HTN  - at home Ramipril , HCTZ and atenolol     # HLD   Continue statin     # Constipation   - at home senna , Dulcolax     Patient is seen and examined. Will dc home today.    Vital Signs Last 24 Hrs  T(C): 36.6 (16 Dec 2024 07:40), Max: 36.6 (15 Dec 2024 21:08)  T(F): 97.9 (16 Dec 2024 07:40), Max: 97.9 (15 Dec 2024 21:08)  HR: 68 (16 Dec 2024 07:40) (68 - 84)  BP: 138/77 (16 Dec 2024 07:40) (132/92 - 138/77)  BP(mean): --  RR: 18 (16 Dec 2024 07:40) (18 - 18)  SpO2: 97% (16 Dec 2024 07:40) (95% - 97%)    Parameters below as of 16 Dec 2024 07:40  Patient On (Oxygen Delivery Method): room air    PHYSICAL EXAM:  GENERAL: NAD, lying in bed comfortably  HEAD:  Atraumatic, Normocephalic  EYES: conjunctiva and sclera clear  ENT: Moist mucous membranes  NECK: Supple, No JVD  CHEST/LUNG: Clear to auscultation bilaterally; No rales, rhonchi, wheezing. Unlabored respirations  HEART: Regular rate and rhythm; No murmurs  ABDOMEN: Bowel sounds present; Soft, Nontender, Nondistended. Payne cath in place   EXTREMITIES:  2+ Peripheral Pulses, brisk capillary refill. No clubbing, cyanosis, or edema  NERVOUS SYSTEM:  Alert & Oriented X3, speech clear. No deficits   MSK: FROM all 4 extremities, full and equal strength

## 2024-12-16 NOTE — DISCHARGE NOTE NURSING/CASE MANAGEMENT/SOCIAL WORK - FINANCIAL ASSISTANCE
[facility] provides services at a reduced cost to those who are determined to be eligible through [facility]’s financial assistance program. Information regarding [facility]’s financial assistance program can be found by going to [email or web address] or by calling [phone].

## 2024-12-16 NOTE — DISCHARGE NOTE NURSING/CASE MANAGEMENT/SOCIAL WORK - NSDCPEFALRISK_GEN_ALL_CORE
For information on Fall & Injury Prevention, visit: [email or web address] OR  [email or web address] OR  https://www.cdc.gov/steadi/patient.html

## 2024-12-16 NOTE — DISCHARGE NOTE PROVIDER - NSDCCPCAREPLAN_GEN_ALL_CORE_FT
PRINCIPAL DISCHARGE DIAGNOSIS  Diagnosis: Urinary retention  Assessment and Plan of Treatment:       SECONDARY DISCHARGE DIAGNOSES  Diagnosis: ORLANDO (acute kidney injury)  Assessment and Plan of Treatment:     Diagnosis: Acute UTI  Assessment and Plan of Treatment:

## 2024-12-16 NOTE — PROGRESS NOTE ADULT - REASON FOR ADMISSION
hematuria, dysuria, sepsis  2/2 UTI

## 2024-12-16 NOTE — DISCHARGE NOTE PROVIDER - NSDCFUSCHEDAPPT_GEN_ALL_CORE_FT
[facility]  UROLOGY [address]  Scheduled Appointment: 12/17/2024    Kaushik Car  [facility]  UROLOGY [address]  Scheduled Appointment: 12/17/2024    Magdy Mota  [facility]  ORTHOSUR[address]  Scheduled Appointment: 01/03/2025

## 2024-12-17 ENCOUNTER — APPOINTMENT (OUTPATIENT)
Dept: UROLOGY | Facility: CLINIC | Age: 79
End: 2024-12-17
Payer: MEDICARE

## 2024-12-17 DIAGNOSIS — N20.0 CALCULUS OF KIDNEY: ICD-10-CM

## 2024-12-17 PROCEDURE — 52310 CYSTOSCOPY AND TREATMENT: CPT

## 2024-12-17 RX ORDER — TAMSULOSIN HYDROCHLORIDE 0.4 MG/1
0.4 CAPSULE ORAL
Qty: 90 | Refills: 3 | Status: ACTIVE | COMMUNITY
Start: 2024-12-17 | End: 1900-01-01

## 2024-12-18 LAB
CULTURE RESULTS: SIGNIFICANT CHANGE UP
CULTURE RESULTS: SIGNIFICANT CHANGE UP
SPECIMEN SOURCE: SIGNIFICANT CHANGE UP
SPECIMEN SOURCE: SIGNIFICANT CHANGE UP

## 2024-12-19 LAB
CELL MATERIAL STONE EST-MCNT: SIGNIFICANT CHANGE UP
LABORATORY COMMENT REPORT: SIGNIFICANT CHANGE UP
NIDUS STONE QN: SIGNIFICANT CHANGE UP

## 2024-12-20 DIAGNOSIS — N39.0 URINARY TRACT INFECTION, SITE NOT SPECIFIED: ICD-10-CM

## 2024-12-20 DIAGNOSIS — A49.9 URINARY TRACT INFECTION, SITE NOT SPECIFIED: ICD-10-CM

## 2024-12-20 DIAGNOSIS — R39.15 URGENCY OF URINATION: ICD-10-CM

## 2024-12-20 RX ORDER — PHENAZOPYRIDINE HYDROCHLORIDE 200 MG/1
200 TABLET ORAL 3 TIMES DAILY
Qty: 15 | Refills: 2 | Status: ACTIVE | COMMUNITY
Start: 2024-12-20 | End: 1900-01-01

## 2024-12-20 RX ORDER — OXYBUTYNIN CHLORIDE 5 MG/1
5 TABLET ORAL
Qty: 30 | Refills: 1 | Status: ACTIVE | COMMUNITY
Start: 2024-12-20 | End: 1900-01-01

## 2024-12-20 RX ORDER — TRAMADOL HYDROCHLORIDE 50 MG/1
50 TABLET, COATED ORAL
Qty: 20 | Refills: 0 | Status: ACTIVE | COMMUNITY
Start: 2024-12-20 | End: 1900-01-01

## 2024-12-20 RX ORDER — CEFUROXIME AXETIL 500 MG/1
500 TABLET, FILM COATED ORAL
Qty: 10 | Refills: 0 | Status: ACTIVE | COMMUNITY
Start: 2024-12-20 | End: 1900-01-01

## 2024-12-23 NOTE — CDI QUERY NOTE - NSCDIOTHERTXTBX_GEN_ALL_CORE_HH
Although developing sepsis is documented in the medical record, it lacks the specific indicators to clinically substantiate and support the diagnosis.     In order to ensure accurate coding and accuracy of the clinical record, the documentation in this patient’s medical record requires additional clarification of the diagnosis.    For reference, please see the [facility] sepsis ED sepsis/severe sepsis management algorithm  located on the [facility] Intranet     Please clarify the status of sepsis in your progress note and/or discharge summary:    •	Sepsis ruled out after study  •	Sepsis ruled in (please document additional supporting information or mitigating factors to support this diagnosis)  •	Other (specify)    Supporting documentation:     SIRS criteria on admission: Temp 96.5    Culture - Urine (12.13.24 @ 14:57) Specimen Source Urine: No growth  Specimen Source: .Blood BLOOD (12.13.24 @ 14:57) No growth  Specimen Source: .Blood BLOOD (12.13.24 @ 14:57) No growth    ED Provider note 12/13/2024  Vital signs reviewed systolic blood pressure in the 100s currently afebrile heart rate normal  SEPSIS – was this patient treated for sepsis? No.     HP Adult 12/13/2024  T(F): 97.9 (13 Dec 2024 16:56), Max: 97.9 (13 Dec 2024 16:56)  HR: 61 (13 Dec 2024 15:20) (55 - 61)  RR: 18 (13 Dec 2024 15:20) (18 - 18)  WBC Count: 7.55 - [3.80 - 10.50 K/uL]  Developing sepsis 2/2 UTI  - will continue Rocephin for now and culture come back     Discharge summary 12/16/2024   Acute UTI  - UA + turbid protein 300 large blood small bili large LE + nitrite wbc 66    -will dc home with PO course of antibiotics
Clinical documentation and/or evidence of the patient’s presentation, evaluation, and medical management, as evidenced below, may support a cause and effect link that is not documented in the medical record.  In order to ensure accurate coding and accuracy of the clinical record, the documentation in this patient’s medical record requires additional clarification.      If you think the supporting documentation and/or clinical evidence supports a cause and effect link, please include more specific documentation associated with these findings in your progress note and/or discharge summary.      Please clarify if there is a relationship between the UTI and bilateral stents, such as:    •	UTI ruled out, hematuria secondary to the bilateral stents placed on 12/11, present on admission.  •	UTI likely secondary to the bilateral stents placed on 12/11, present on admission.  •	UTI unrelated to (specify diagnosis or procedure)   •	Other (specify)    Supporting documentation and/or clinical evidence:    Culture - Urine (12.13.24 @ 14:57) Specimen Source Urine: No growth  Specimen Source: .Blood BLOOD (12.13.24 @ 14:57) No growth  Specimen Source: .Blood BLOOD (12.13.24 @ 14:57) No growth    Discharge provider note 12/16/2024  bilateral kidney stones s/p bilateral stent placement 12/ 11  Hydronephrosis   - mild flank pain - improved   - UA + turbid protein 300 large blood small bili large LE + nitrite wbc 66    - CT A/P: Interval placement of bilateral nephroureteral stents with positioning as above. New mild bilateral perinephric and periureteral stranding, slightly increased on the right, with diffuse urothelial thickening. Mild bilateral hydronephrosis despite presence of stents and no obstructing ureteral calculus    HP Adult 12/13/2024   bilateral kidney stones s/p bilateral stent placement by urologist Dr. Car on 12/ 11 presenting with hematuria and dysuria starting yesterday.

## 2024-12-23 NOTE — CDI QUERY NOTE - NSCDIOTHERTXTBX2_GEN_ALL_CORE_FT
Clinical documentation and/or evidence in the medical record indicates that this patient has acute kidney injury.  In order to ensure accurate coding and accuracy of the clinical record, the documentation in this patient’s medical record requires additional clarification.      Please include more specific documentation as to the type of acute kidney injury in your progress note and/or discharge summary.    Please clarify the extent/specificity of this patient’s Acute Kidney Injury:          •	[place] with Acute Tubular Necrosis (ATN)      •	[place] only      •	Other (specify)    Supporting documentation and/or clinical evidence:     Creatinine: 1.26 mg/dL (12.16.24 @ 06:46)   Creatinine: 1.47 mg/dL (12.15.24 @ 06:58)   Creatinine: 2.04 mg/dL (12.14.24 @ 09:35)   Creatinine: 3.22 mg/dL (12.13.24 @ 14:57)   Creatinine: 1.75 mg/dL (12.03.24 @ 11:37)   FENa = 6.3%   Feurea = 60.0%   Urine sodium concentration = Sodium, Random Urine: 53: Test Repeated   IV contrast: No  Other medical conditions:    Discharge provider note 12/16/2024  [place]   - s/p IV NS   - Cr improved  - Cr 1.26 on 12/16/24    HP Adult 12/13/2024   bilateral kidney stones s/p bilateral stent placement by urologist Dr. Car on 12/ 11 presenting with hematuria and dysuria starting yesterday.   3200 NS in ER  [place]   - BUN/Cr: 57/ 3.2  last cr 1.75   - likely prerenal azotemia from decreased PO intake   - NS at rate of 60 cc/hr x 20 hours   - check urine lytes  - Avoid nephrotoxic agents.

## 2024-12-24 NOTE — CHART NOTE - NSCHARTNOTEFT_GEN_A_CORE
- sepsis ruled out  - hypotension and hypothermic on admission   - Acute UTI admit diagnosis- pt discharge on abx

## 2025-01-02 ENCOUNTER — APPOINTMENT (OUTPATIENT)
Dept: UROLOGY | Facility: CLINIC | Age: 80
End: 2025-01-02
Payer: MEDICARE

## 2025-01-02 DIAGNOSIS — R33.9 RETENTION OF URINE, UNSPECIFIED: ICD-10-CM

## 2025-01-02 DIAGNOSIS — N20.0 CALCULUS OF KIDNEY: ICD-10-CM

## 2025-01-02 PROCEDURE — A4216: CPT | Mod: NC

## 2025-01-02 PROCEDURE — 51700 IRRIGATION OF BLADDER: CPT

## 2025-01-03 ENCOUNTER — APPOINTMENT (OUTPATIENT)
Dept: ORTHOPEDIC SURGERY | Facility: CLINIC | Age: 80
End: 2025-01-03

## 2025-01-28 ENCOUNTER — APPOINTMENT (OUTPATIENT)
Dept: UROLOGY | Facility: CLINIC | Age: 80
End: 2025-01-28
Payer: MEDICARE

## 2025-01-28 DIAGNOSIS — N20.0 CALCULUS OF KIDNEY: ICD-10-CM

## 2025-01-28 DIAGNOSIS — R33.9 RETENTION OF URINE, UNSPECIFIED: ICD-10-CM

## 2025-01-28 PROCEDURE — 99213 OFFICE O/P EST LOW 20 MIN: CPT

## 2025-02-26 DIAGNOSIS — R82.991 HYPOCITRATURIA: ICD-10-CM

## 2025-02-26 RX ORDER — POTASSIUM CITRATE 10 MEQ/1
10 MEQ TABLET, EXTENDED RELEASE ORAL TWICE DAILY
Qty: 360 | Refills: 0 | Status: ACTIVE | COMMUNITY
Start: 2025-02-26 | End: 1900-01-01

## 2025-03-11 ENCOUNTER — APPOINTMENT (OUTPATIENT)
Dept: UROLOGY | Facility: CLINIC | Age: 80
End: 2025-03-11

## 2025-04-02 NOTE — PATIENT PROFILE ADULT - VISION (WITH CORRECTIVE LENSES IF THE PATIENT USUALLY WEARS THEM):
[facility] Approved    Filling Pharmacy: [facility]     Normal vision: sees adequately in most situations; can see medication labels, newsprint

## 2025-05-01 ENCOUNTER — NON-APPOINTMENT (OUTPATIENT)
Age: 80
End: 2025-05-01

## 2025-05-21 ENCOUNTER — RX RENEWAL (OUTPATIENT)
Age: 80
End: 2025-05-21

## 2025-06-19 NOTE — ASU DISCHARGE PLAN (ADULT/PEDIATRIC) - DISCHARGE PLAN IS COMPLETE AND GIVEN TO PATIENT
[facility]  Infectious Disease  Progress Note    Patient Name: Chelle Chandra  MRN: [id number]  Admission Date: 6/11/2025  Length of Stay: 8 days  Attending Physician: Lorena Agudelo MD  Primary Care Provider: Jorge Silver MD    Isolation Status: No active isolations  Assessment/Plan:      Active Diagnoses:    Diagnosis Date Noted POA    Severe malnutrition [E43] 05/31/2025 Yes      Problems Resolved During this Admission:           Michael Machuca DO  Infectious Disease  [facility]    Subjective:       No reported fevers overnight by the medical nursing staff or by the patient    Review of Systems:   General: Patient denies any unexplained weight loss, night sweats, fatigue malaise or lethargy.   HEENT: (Head, Eyes, Ears, Nose and Throat): Patient denies any visual changes, headaches, eye pain, double vision, sore throat, recent, trauma, ear pain, epistaxis, tinnitus, or sinusitis.   Neck: No signs of recent trauma, or swelling.   Heart: No signs of chest pain, palpitations, orthopnea of loss of consciousness.   Lungs: No cough, sputum, wheeze, hemoptysis, shortness of breath, exercise intolerance.   GI: No abdominal pain, unintentional weight loss, nausea/vomiting, diarrhea/constipation, hematemesis, bright red blood per rectum, or melena.   : No incontinence, dysuria, hematuria, nocturia, polyuria, discharge, increased frequency, or urgency.   Vascular: No signs of claudication, leg edema, varicose veins, or thrombosis.   Neurologic: No loss of sensation, No numbness, No tingling, No Paralysis, No history of tremors or seizures.   Endocrine: No heat/cold intolerance, No excessive sweating, polyuria, polydipsia, or polyphagia.   Hematology: No anemia, easy bruising, petechiae or purpura Skin: No rashes, itching skin, open lesions, skin redness, hives or sensitivity to sun exposure, no changes in nail, hair or skin color.   Musculoskeletal: Patient  : Yes denies joint swelling, decreased range of motion, crepitus, functional deficit, or arthritis.   Psychiatric: no signs of depression, anxiety or recent change in mood and sleep patterns.    Objective:     Vital Signs (Most Recent):  Temp: 98 °F (36.7 °C) (06/19/25 1114)  Pulse: 72 (06/19/25 1114)  Resp: 18 (06/19/25 1114)  BP: 118/76 (06/19/25 1114)  SpO2: 98 % (06/19/25 1114) Vital Signs (24h Range):  Temp:  [98 °F (36.7 °C)-98.8 °F (37.1 °C)] 98 °F (36.7 °C)  Pulse:  [72-86] 72  Resp:  [16-18] 18  SpO2:  [93 %-98 %] 98 %  BP: (100-122)/(70-82) 118/76     Weight: 49.9 kg (110 lb)  Body mass index is 17.75 kg/m².    Estimated Creatinine Clearance: 74.8 mL/min (based on SCr of 0.63 mg/dL).    Physical Exam:   General Appearance: Patient is well nourished and developed adult in no acute distress. Alert and Oriented Times 3   HEENT (Head, Eyes, Ears, Nose and Throat): Normocephalic, Nontraumatic. Pupils equal, round, reactive to light, Accommodation present, Extraocular movements and muscles intact. No cervical Lymphadenopathy, Moist Mucus Membranes, No thyromegaly.   Neck: Soft, Supple, No signs of trauma, No carotid bruits.   Cardiovascular: Regular Rate and Rhythm, No murmurs, gallops, clicks or rubs.   Respiratory: Clear to auscultation bilaterally, No wheezing, rales or rhonchi.   Abdominal: Soft, Non-tender, Non-distention, No peritoneal signs, No rebound tenderness, Present Bowel sounds in all four quadrants, No ascites present.   Extremities: No clubbing, No cyanosis, No edema.   Skin: No scars, No Rashes, Skin is Warm and Dry to the touch, Negative for Sacral Decubitus Ulcers.   Vascular Exam: Pulses 3 out of 4 in the brachial, radial and no JVD noticed Lymphatics: No Cervical, Supra/Infraclavicular Axillary, Trochlear, or Inguinal Adenopathy   Rectal Exam: Deferred at this time.   Neurological Exam: Limited Neurological Exam, patient response to physical and verbal stimuli      Antibiotics (From admission,  "onward)      Start     Stop Route Frequency Ordered    06/17/25 1530  metronidazole IVPB 500 mg         06/24/25 1529 IV Every 8 hours (non-standard times) 06/17/25 1421    06/16/25 1515  ceFEPIme injection 1 g         -- IV Every 6 hours (non-standard times) 06/16/25 1412    06/16/25 1511  vancomycin - pharmacy to dose  (vancomycin IVPB (PEDS and ADULTS))        Placed in "And" Linked Group    -- IV pharmacy to manage frequency 06/16/25 1412    06/16/25 1500  vancomycin 1,250 mg in D5W 250 mL IVPB (admixture device)         -- IV Every 24 hours (non-standard times) 06/16/25 1415           Microbiology Results (last 7 days)       Procedure Component Value Units Date/Time    Tissue Culture - Aerobic [[id number]]  (Abnormal)  (Susceptibility) Collected: 06/16/25 1357    Order Status: Completed Specimen: Bone from Head Updated: 06/19/25 1001     Tissue - Aerobic Culture Moderate Staphylococcus epidermidis    Tissue Culture - Aerobic [[id number]]  (Abnormal)  (Susceptibility) Collected: 06/16/25 1338    Order Status: Completed Specimen: Tissue from Head Updated: 06/19/25 0953     Tissue - Aerobic Culture Moderate Staphylococcus epidermidis    Anaerobic Culture [[id number]] Collected: 06/16/25 1357    Order Status: Completed Specimen: Bone from Head Updated: 06/19/25 0901     Anaerobe Culture No Anaerobes Isolated    Anaerobic Culture [[id number]] Collected: 06/16/25 1338    Order Status: Completed Specimen: Tissue from Head Updated: 06/19/25 0901     Anaerobe Culture No Anaerobes Isolated    Gram Stain [[id number]] Collected: 06/16/25 1357    Order Status: Completed Specimen: Bone from Head Updated: 06/16/25 1557     GRAM STAIN Moderate WBC observed      Rare Gram Positive Rods    Gram Stain [[id number]] Collected: 06/16/25 1338    Order Status: Completed Specimen: Tissue from Head Updated: 06/16/25 1556     GRAM STAIN Moderate WBC observed      Rare Gram positive cocci      Rare Gram Positive Rods    Fungal Culture " [[id number]] Collected: 06/16/25 1357    Order Status: Sent Specimen: Bone from Head Updated: 06/16/25 1438    Fungal Culture [[id number]] Collected: 06/16/25 1338    Order Status: Sent Specimen: Tissue from Head Updated: 06/16/25 1438    AFB Smear [[id number]]     Order Status: Sent Specimen: Bone from Head     Mycobacteria and Nocardia Culture [[id number]]     Order Status: Sent Specimen: Bone from Head     AFB Smear [[id number]]     Order Status: Sent Specimen: SWAB from Skull     Mycobacteria and Nocardia Culture [[id number]]     Order Status: Sent Specimen: Tissue from Skull            Imaging Results    None        Vitals:    06/19/25 1114   BP: 118/76   Pulse: 72   Resp: 18   Temp: 98 °F (36.7 °C)            Assessments        S/P Subdural hematoma requiring craniotomy with evacuation and MMA embolization early May with recurrence late May 2025 requiring diony hole  Increased subdural fluid collection 6.11.25  --currently on vancomycin cefepime and Flagyl  --intraoperative culture data Positive for Staphylococcus epidermidis              Past Medical of  hypothyroidism, anxiety, bronchial asthma, COPD, type 2 diabetes mellitus, 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                Recommendations      intraoperative culture data Positive for Staphylococcus epidermidis; it is hard to determine if this culture data is consistent with either skin colonization or possible active infection, no obvious signs of active CNS infection therefore I only recommend at least 14 days of total IV antibiotic therapy, while the patient is physically in the hospital continue with broad-spectrum IV antibiotic therapy consisting of vancomycin cefepime and Flagyl however upon discharge I recommend the patient be sent home on a combination of vancomycin and Rocephin until July 3rd, all outpatient antibiotic orders and labs have been sent over to case management, vancomycin level goal 10-15    Final outpatient IV  antibiotic recommendation  IV vancomycin 1000mg every 24 hours plus IV Rocephin 2 g every 24 hours until 7.3.2025            Thank you, If the consulting physician, specialty physician staff, pharmacy staff, nursing staff or respiratory / occupational therapy staff have any additional questions regarding ID consultation recommendations and treatment plans; please feel free to contact us at anytime.               Michael Machuca D.O., F.I.D.S.A.  Infectious Disease Physician   [facility]  cell [phone]